# Patient Record
Sex: FEMALE | Race: WHITE | Employment: FULL TIME | URBAN - METROPOLITAN AREA
[De-identification: names, ages, dates, MRNs, and addresses within clinical notes are randomized per-mention and may not be internally consistent; named-entity substitution may affect disease eponyms.]

---

## 2019-11-06 ENCOUNTER — OFFICE VISIT (OUTPATIENT)
Dept: OBGYN CLINIC | Age: 34
End: 2019-11-06

## 2019-11-06 ENCOUNTER — HOSPITAL ENCOUNTER (OUTPATIENT)
Dept: LAB | Age: 34
Discharge: HOME | End: 2019-11-06

## 2019-11-06 DIAGNOSIS — Z34.81 ENCOUNTER FOR SUPERVISION OF OTHER NORMAL PREGNANCY IN FIRST TRIMESTER: ICD-10-CM

## 2019-11-06 DIAGNOSIS — Z34.81 ENCOUNTER FOR SUPERVISION OF OTHER NORMAL PREGNANCY IN FIRST TRIMESTER: Primary | ICD-10-CM

## 2019-11-06 PROBLEM — O21.9 NAUSEA AND VOMITING OF PREGNANCY, ANTEPARTUM: Status: ACTIVE | Noted: 2019-11-06

## 2019-11-06 PROBLEM — Z34.80 ENCOUNTER FOR SUPERVISION OF OTHER NORMAL PREGNANCY, UNSPECIFIED TRIMESTER: Status: ACTIVE | Noted: 2019-11-06

## 2019-11-06 PROBLEM — O09.299 HISTORY OF PRE-ECLAMPSIA IN PRIOR PREGNANCY, CURRENTLY PREGNANT: Status: ACTIVE | Noted: 2019-11-06

## 2019-11-06 PROBLEM — F32.A DEPRESSION: Status: ACTIVE | Noted: 2019-11-06

## 2019-11-06 RX ORDER — DOXYLAMINE SUCCINATE AND PYRIDOXINE HYDROCHLORIDE, DELAYED RELEASE TABLETS 10 MG/10 MG 10; 10 MG/1; MG/1
2 TABLET, DELAYED RELEASE ORAL
Qty: 100 TAB | Refills: 3 | Status: SHIPPED | OUTPATIENT
Start: 2019-11-06 | End: 2019-11-07

## 2019-11-06 RX ORDER — ASPIRIN 81 MG/1
TABLET ORAL DAILY
COMMUNITY

## 2019-11-06 RX ORDER — CITALOPRAM 20 MG/1
20 TABLET, FILM COATED ORAL DAILY
Refills: 3 | COMMUNITY
Start: 2019-10-02 | End: 2020-01-14 | Stop reason: SDUPTHER

## 2019-11-06 NOTE — PROGRESS NOTES
Current pregnancy history:    Abdifatah Bro" is a 29 y.o. female who presents for the evaluation of pregnancy accompanied by her , Judi Parr. They are very excited about this planned pregnancy. Relieved because she had a miscarriage 10/23/18. They had a previous baby with the midwives at [address] [place] [address] 3.5 years ago. It was a great experience despite having pre-eclampsia dn needing an induction. .  Looking forward to another NCB! No LMP recorded. LMP history:  The date of her LMP is uncertain. A urine pregnancy test was positive 5 weeks ago. She was not on the pill at conception. Ultrasound data:  She had an  ultrasound done by the ultrasound tech today which revealed a viable archibald pregnancy with a gestational age of 11 weeks and 1 days giving an [address] of 2020. Pregnancy symptoms:    Since her LMP she has experienced  urinary frequency, breast tenderness, nausea and vomiting. She has been vomiting over the last few weeks. Associated signs and symptoms which she denies: dysuria, discharge, vaginal bleeding. She states she has gained weight:  Approximately 5 pounds over the last few weeks. Relevant past pregnancy history:   She has the followiing pregnancy history: Her last pregnancy was uncomplicated. She has no history of  delivery. Relevant past medical history:(relevant to this pregnancy): noncontributory. Pap/Occupational history:  Last pap smear: last year Results: Normal      Her occupation is: hospice nurse. Substance history: negative for alcohol, tobacco and street drugs. Positive for nothing. Exposure history: There are no indoor cats in the home. The patient was instructed to not change the cat litter. She admits close contact with children on a regular basis. She has had chicken pox or the vaccine in the past.   Patient denies issues with domestic violence.      Genetic Screening/Teratology Counseling: (Includes patient, baby's father, or anyone in either family with:)  1.  Patient's age >/= 28 at [facility]?-- no  .   2. Thalassemia ([facility] [place], [address], or  background): MCV<80?--no.     3.  Neural tube defect (meningomyelocele, spina bifida, anencephaly)?--no.   4.  Congenital heart defect?--no.  5.  Down syndrome?--no.   6.  Dwain-Sachs ([employer])?--no.   7.  Canavan's Disease?--no.   8.  Familial Dysautonomia?--no.   9.  Sickle cell disease or trait ()? --no   The patient has not been tested for sickle trait  10. Hemophilia or other blood disorders?--no. 11.  Muscular dystrophy?--no. 12.  Cystic fibrosis?--no. 13.  [place]'s Chorea?--no. 14.  Mental retardation/autism (if yes was person tested for Fragile X)?--no. 15.  Other inherited genetic or chromosomal disorder?--no. 12.  Maternal metabolic disorder (DM, PKU, etc)?--no. 17.  Patient or FOB with a child with a birth defect not listed above?--no.  17a. Patient or FOB with a birth defect themselves?--no. 18.  Recurrent pregnancy loss, or stillbirth?--no. 19.  Any medications since LMP other than prenatal vitamins (include vitamins,  supplements, OTC meds, drugs, alcohol)? --Celexa, vit B complex, doxylamine, senna. 20.  Any other genetic/environmental exposure to discuss?--no. Infection History:  1. Lives with someone with TB or TB exposed?--no.   2.  Patient or partner has history of genital herpes?--no.  3.  Rash or viral illness since LMP?--no.    4.  History of STD (GC, CT, HPV, syphilis, HIV)? --no   5. Other: OTHER? Past Medical History:   Diagnosis Date    Abnormal Papanicolaou smear of cervix     Bacterial vaginosis     Depression     Epilepsy ([facility] [address]     last seizure 5years; secondary to low blood sugar, per pt.  Essential hypertension     Hypoglycemia 2005, 2010    Pancreatitis 2007     History reviewed. No pertinent surgical history.   Social History     Occupational History    Not on file   Tobacco Use    Smoking status: Former Smoker     Last attempt to quit: 3/7/2013     Years since quittin.6    Smokeless tobacco: Never Used   Substance and Sexual Activity    Alcohol use: No     Alcohol/week: 0.0 standard drinks    Drug use: No    Sexual activity: Yes     Partners: Male     Birth control/protection: None     Comment: in relationship since      Family History   Problem Relation Age of Onset    Elevated Lipids Mother     Elevated Lipids Father     Diabetes Maternal Grandmother     Elevated Lipids Maternal Grandmother     Hypertension Maternal Grandmother     Cancer Paternal Grandmother     Cancer Paternal Grandfather     Hypertension Maternal Aunt        Allergies   Allergen Reactions    Seafood Hives and Nausea and Vomiting     Prior to Admission medications    Medication Sig Start Date End Date Taking? Authorizing Provider   doxylamine-pyridoxine, vit B6, (DICLEGIS) 10-10 mg TbEC DR tablet Take 2 Tabs by mouth nightly. 19  Yes Mary Lou Reyes CNM   citalopram (CELEXA) 20 mg tablet Take 20 mg by mouth daily. 10/2/19  Yes Provider, Historical   PRENATAL VIT W-CA,FE,FA,<1 MG, (PRENATAL VITAMIN PO) Take  by mouth. Yes Provider, Historical   aspirin delayed-release 81 mg tablet Take  by mouth daily. Provider, Historical   cyclobenzaprine (FLEXERIL) 10 mg tablet Take 1 Tab by mouth three (3) times daily as needed for Muscle Spasm(s). Indications: MUSCLE SPASM 12/17/15 11/6/19  Darreld [facility], CNM   DIPHENHYDRAMINE HCL (UNISOM, DIPHENHYDRAMINE, PO) Take  by mouth. 19  Provider, Historical   pyridoxine (VITAMIN B-6) 100 mg tablet Take 100 mg by mouth daily. 19  Provider, Historical   Cholecalciferol, Vitamin D3, (VITAMIN D3) 1,000 unit cap Take  by mouth.   19  Provider, Historical        Review of Systems: History obtained from the patient  Constitutional: negative for weight loss, fever, night sweats  HEENT: negative for hearing loss, earache, congestion, snoring, sorethroat  CV: negative for chest pain, palpitations, edema  Resp: negative for cough, shortness of breath, wheezing  Breast: negative for breast lumps, nipple discharge, galactorrhea  GI: negative for change in bowel habits, abdominal pain, black or bloody stools  : negative for frequency, dysuria, hematuria, vaginal discharge  MSK: negative for back pain, joint pain, muscle pain  Skin: negative for itching, rash, hives  Neuro: negative for dizziness, headache, confusion, weakness  Psych: negative for anxiety, depression, change in mood  Heme/lymph: negative for bleeding, bruising, pallor    Objective: There were no vitals taken for this visit.     Physical Exam:   PHYSICAL EXAMINATION    Constitutional  · Appearance: well-nourished, well developed, alert, in no acute distress    HENT  · Head  · Face: appears normal  · Eyes: appear normal  · Ears: normal  · Mouth: normal  · Lips: no lesions    Neck  · Inspection/Palpation: normal appearance, no masses or tenderness  · Lymph Nodes: no lymphadenopathy present  · Thyroid: gland size normal, nontender, no nodules or masses present on palpation    Chest  · Respiratory Effort: breathing unlabored  · Auscultation: normal breath sounds    Cardiovascular  · Heart:  · Auscultation: regular rate and rhythm without murmur    Breasts  · Inspection of Breasts: breasts symmetrical, no skin changes, no discharge present, nipple appearance normal, no skin retraction present  · Palpation of Breasts and Axillae: no masses present on palpation, no breast tenderness  · Axillary Lymph Nodes: no lymphadenopathy present    Gastrointestinal  · Abdominal Examination: abdomen non-tender to palpation, normal bowel sounds, no masses present  · Liver and spleen: no hepatomegaly present, spleen not palpable  · Hernias: no hernias identified    Genitourinary  · External Genitalia: normal appearance for age, no discharge present, no tenderness present, no inflammatory lesions present, no masses present, no atrophy present  · Vagina: normal vaginal vault without central or paravaginal defects, no discharge present, no inflammatory lesions present, no masses present  · Bladder: non-tender to palpation  · Urethra: appears normal  · Cervix: normal   · Uterus: enlarged, normal shape, soft  · Adnexa: no adnexal tenderness present, no adnexal masses present  · Perineum: perineum within normal limits, no evidence of trauma, no rashes or skin lesions present  · Anus: anus within normal limits, no hemorrhoids present  · Inguinal Lymph Nodes: no lymphadenopathy present    Skin  · General Inspection: no rash, no lesions identified    Neurologic/Psychiatric  · Mental Status:  · Orientation: grossly oriented to person, place and time  · Mood and Affect: mood normal, affect appropriate    Assessment:   Intrauterine pregnancy with the following problems identified: nausea and vomiting of pregnancy  History of pre-eclampsia. Plan:   Orders Placed This Encounter    CHLAMYDIA/GC PCR     Standing Status:   Future     Standing Expiration Date:   5/6/2020     Order Specific Question:   Sample source     Answer:   Urine [258]     Order Specific Question:   Specimen source     Answer:   Urine [258]    CULTURE, URINE     Standing Status:   Future     Standing Expiration Date:   5/6/2020    doxylamine-pyridoxine, vit B6, (DICLEGIS) 10-10 mg TbEC DR tablet     Sig: Take 2 Tabs by mouth nightly. Dispense:  100 Tab     Refill:  3     2 tabs qhs If symptoms persist, add 1 tab qam starting day 3 If symptoms persist, add 1 tab qpm starting day 4    citalopram (CELEXA) 20 mg tablet     Sig: Take 20 mg by mouth daily. Refill:  3    aspirin delayed-release 81 mg tablet     Sig: Take  by mouth daily. Offered genetic testing options including carrier testing, NIPT, AFP4 and NT screening.  Desires NIPT  Will do initial prenatal labs and NIPT at next visit in 2 weeks  Course of pregnancy discussed including visit schedule, routine U/S, glucola testing, etc.  Avoid alcoholic beverages and illicit/recreational drugs use  Take prenatal vitamins or folic acid daily. Discussed nutrition, toxoplasmosis precautions, sexual activity, exercise, need for influenza vaccine, environmental and work hazards, travel advice, screen for domestic violence, need for seat belts. Discussed seafood, unpasteurized dairy products, deli meat, artificial sweeteners, and caffeine. Information on prenatal classes/breastfeeding given. Patient encouraged not to smoke. Discussed current prescription drug use. Given medication list.  Discussed the use of over the counter medications and chemicals. Route of delivery discussed, including consultation with MD during pregnancy if history of  delivery. Pt understands risk of hemorrhage during pregnancy and post delivery and would accept blood products if necessary in life-threatening emergencies. Problem list reviewed and updated. Centering pregnancy offered. At this time the patient declines due to distance. They live in [address] care/philosophy discussed including MD collaboration  New OB packet given and reviewed. Zika precautions reviewed  Follow-up in 2 weeks or sooner as needed    Handouts given to pt.

## 2019-11-07 ENCOUNTER — TELEPHONE (OUTPATIENT)
Dept: OBGYN CLINIC | Age: 34
End: 2019-11-07

## 2019-11-07 LAB
CHLAMYDIA, EXTERNAL: NORMAL
N. GONORRHEA, EXTERNAL: NEGATIVE

## 2019-11-07 RX ORDER — DOXYLAMINE SUCCINATE AND PYRIDOXINE HYDROCHLORIDE, DELAYED RELEASE TABLETS 10 MG/10 MG 10; 10 MG/1; MG/1
TABLET, DELAYED RELEASE ORAL
Qty: 120 TAB | Refills: 3 | Status: SHIPPED | OUTPATIENT
Start: 2019-11-07

## 2019-11-07 NOTE — TELEPHONE ENCOUNTER
Call received 152Pm    29year old patient last seen in the office on yesterday for NOB. Patient denies vaginal bleeding or cramping. Patient calling to say that a PA is needed for her diclegis prescription sent yesterday. This nurse resent the prescription as per Nurse mid wife order. and TS was going to do PA      Patient advised and verbalized understanding.

## 2019-11-09 LAB
BACTERIA SPEC CULT: NORMAL
CC UR VC: NORMAL
SERVICE CMNT-IMP: NORMAL

## 2019-11-10 LAB
C TRACH RRNA SPEC QL NAA+PROBE: NEGATIVE
N GONORRHOEA RRNA SPEC QL NAA+PROBE: NEGATIVE
SPECIMEN SOURCE: NORMAL

## 2019-11-20 ENCOUNTER — ROUTINE PRENATAL (OUTPATIENT)
Dept: OBGYN CLINIC | Age: 34
End: 2019-11-20

## 2019-11-20 VITALS — DIASTOLIC BLOOD PRESSURE: 65 MMHG | BODY MASS INDEX: 24.11 KG/M2 | WEIGHT: 149.4 LBS | SYSTOLIC BLOOD PRESSURE: 110 MMHG

## 2019-11-20 DIAGNOSIS — O09.291 HX OF PRE-ECLAMPSIA IN PRIOR PREGNANCY, CURRENTLY PREGNANT, FIRST TRIMESTER: Primary | ICD-10-CM

## 2019-11-20 DIAGNOSIS — Z34.81 ENCOUNTER FOR SUPERVISION OF OTHER NORMAL PREGNANCY IN FIRST TRIMESTER: ICD-10-CM

## 2019-11-20 LAB
ANTIBODY SCREEN, EXTERNAL: NEGATIVE
HBSAG, EXTERNAL: NEGATIVE
HIV, EXTERNAL: NORMAL
RUBELLA, EXTERNAL: NORMAL

## 2019-11-20 NOTE — PROGRESS NOTES
S: Feeling well. No significant complaints or concerns. No quickening yet. Denies ctxs, LOF, or vaginal bldng. Desires NIPT with NOB blood work, has started ASA for hx of Pre-e. Declines harmony or other genetic testing. Declines visit with genetic counselor unless NIPT abnormal.     O: See prenatal flowsheet for maternal and fetal exam  Blood pressure 110/65, weight 149 lb 6.4 oz (67.8 kg), not currently breastfeeding. A:  10w1d   Size=Dates    P: .  -Overview of prenatal care - discussed midwifery care and physician collaboration, call and coverage  -New OB information given  -Reviewed nutrition, PNV, supplements  -Ordered NOB labs  -Discussed recommended weight gain based on BMI  -Offered traditional genetic screen or NIPT, and NT  -If over 35, discuss amnio/CVS/genetic counseling and declines all except NIPT. Aware of need for AFP if desires.  -Infection precautions  -Discussed environment or work hazards  -Discussed sexual activity  -Smoking cessation counseling if applicable  -Alcohol / drugs/ OTC and Rx medications  -Domestic Violence  -Seatbelt use  See prenatal checklist for other topics covered during today's visit  Baseline PIH and 24 hr urine also ordered.   RTO in 4 weeks for JACQUI with RAGINI

## 2019-11-21 LAB
ABO GROUP BLD: NORMAL
ALBUMIN SERPL-MCNC: 4.2 G/DL (ref 3.5–5.5)
ALBUMIN/GLOB SERPL: 2 {RATIO} (ref 1.2–2.2)
ALP SERPL-CCNC: 36 IU/L (ref 39–117)
ALT SERPL-CCNC: 15 IU/L (ref 0–32)
AST SERPL-CCNC: 15 IU/L (ref 0–40)
BILIRUB SERPL-MCNC: 0.3 MG/DL (ref 0–1.2)
BLD GP AB SCN SERPL QL: NEGATIVE
BUN SERPL-MCNC: 10 MG/DL (ref 6–20)
BUN/CREAT SERPL: 19 (ref 9–23)
CALCIUM SERPL-MCNC: 9.3 MG/DL (ref 8.7–10.2)
CHLORIDE SERPL-SCNC: 102 MMOL/L (ref 96–106)
CO2 SERPL-SCNC: 21 MMOL/L (ref 20–29)
CREAT SERPL-MCNC: 0.53 MG/DL (ref 0.57–1)
ERYTHROCYTE [DISTWIDTH] IN BLOOD BY AUTOMATED COUNT: 11.8 % (ref 12.3–15.4)
GLOBULIN SER CALC-MCNC: 2.1 G/DL (ref 1.5–4.5)
GLUCOSE SERPL-MCNC: 84 MG/DL (ref 65–99)
HBV SURFACE AG SERPL QL IA: NEGATIVE
HCT VFR BLD AUTO: 36.4 % (ref 34–46.6)
HGB BLD-MCNC: 12.5 G/DL (ref 11.1–15.9)
HIV 1+2 AB+HIV1 P24 AG SERPL QL IA: NON REACTIVE
LDH SERPL-CCNC: 153 IU/L (ref 119–226)
MCH RBC QN AUTO: 32.6 PG (ref 26.6–33)
MCHC RBC AUTO-ENTMCNC: 34.3 G/DL (ref 31.5–35.7)
MCV RBC AUTO: 95 FL (ref 79–97)
PLATELET # BLD AUTO: 234 X10E3/UL (ref 150–450)
POTASSIUM SERPL-SCNC: 4 MMOL/L (ref 3.5–5.2)
PROT SERPL-MCNC: 6.3 G/DL (ref 6–8.5)
RBC # BLD AUTO: 3.84 X10E6/UL (ref 3.77–5.28)
RH BLD: POSITIVE
RUBV IGG SERPL IA-ACNC: 1.89 INDEX
SODIUM SERPL-SCNC: 138 MMOL/L (ref 134–144)
URATE SERPL-MCNC: 2.2 MG/DL (ref 2.5–7.1)
WBC # BLD AUTO: 7.6 X10E3/UL (ref 3.4–10.8)

## 2019-11-29 ENCOUNTER — TELEPHONE (OUTPATIENT)
Dept: OBGYN CLINIC | Age: 34
End: 2019-11-29

## 2019-11-29 NOTE — TELEPHONE ENCOUNTER
Patient calling stating that she is waiting for her panorama results to come in and have not heard anything. Patient advised that they are not done yet and as soon as we have them she will be notified. Patient verbalized understanding.

## 2019-12-01 ENCOUNTER — PATIENT MESSAGE (OUTPATIENT)
Dept: OBGYN CLINIC | Age: 34
End: 2019-12-01

## 2019-12-02 ENCOUNTER — TELEPHONE (OUTPATIENT)
Dept: OBGYN CLINIC | Age: 34
End: 2019-12-02

## 2019-12-02 NOTE — TELEPHONE ENCOUNTER
Pt would like a call back with her erendira sharp results. She was told they would be resulted Saturday , please call patient.

## 2019-12-03 ENCOUNTER — PATIENT MESSAGE (OUTPATIENT)
Dept: OBGYN CLINIC | Age: 34
End: 2019-12-03

## 2019-12-03 NOTE — TELEPHONE ENCOUNTER
Edenilson Conti LPN 77/7/3228 4:33 AM EST      ----- Message -----  From: Slade Ye  Sent: 12/1/2019 9:02 AM EST  To: [facility]  Subject: Test Results Question     Have the results from the erendira tests come back?  Not an emergency but so excited to learn the sex of the baby!!!

## 2019-12-03 NOTE — TELEPHONE ENCOUNTER
Results of panorama received from lab and patient contacted and informed of low risk results and of female gender after requesting to know gender.

## 2019-12-23 ENCOUNTER — ROUTINE PRENATAL (OUTPATIENT)
Dept: OBGYN CLINIC | Age: 34
End: 2019-12-23

## 2019-12-23 VITALS — DIASTOLIC BLOOD PRESSURE: 70 MMHG | WEIGHT: 151.6 LBS | SYSTOLIC BLOOD PRESSURE: 113 MMHG | BODY MASS INDEX: 24.47 KG/M2

## 2019-12-23 DIAGNOSIS — Z34.02 ENCOUNTER FOR SUPERVISION OF NORMAL FIRST PREGNANCY, SECOND TRIMESTER: Primary | ICD-10-CM

## 2019-12-23 DIAGNOSIS — Z3A.14 14 WEEKS GESTATION OF PREGNANCY: ICD-10-CM

## 2019-12-23 RX ORDER — CHOLECALCIFEROL (VITAMIN D3) 125 MCG
CAPSULE ORAL
COMMUNITY

## 2019-12-23 NOTE — PROGRESS NOTES
S: Feeling well. No significant complaints or concerns. Fetal movement not appreciated yet Denies ctxs, LOF, or vaginal bldng. [facility]. Still having some nausea in the evenings, but feeling better overall. Is walking for exercise and starting yoga as well. Inquiring about safe ways to help relax and relieve stress    Wants to reiterate that she WILL NOT ACCEPT BLOOD OR BLOOD PRODUCTS       O: See prenatal flowsheet for maternal and fetal exam  Blood pressure 113/70, weight 151 lb 9.6 oz (68.8 kg), not currently breastfeeding.  bpm    A:  14w6d   Size=Dates    P:   - Reviewed ways to relax and decompress - exercise, sleep, meditation. Reviewed safe essential oil use.   -Reviewed NOB labs - WNL, NIPT WNL - it's a girl!  -Encouraged regular dental care - date of last exam few weeks ago  -Offered AFP or AFP tetra - declines  -Reviewed nutrition  -Taking PNV / supplements  -Anatomy scan ordered  -Encouraged CBE/L&D tour - doesn't require, delivered here before      RTO in 4 weeks for JACQUI with CNM and anatomy scan

## 2020-01-14 ENCOUNTER — TELEPHONE (OUTPATIENT)
Dept: OBGYN CLINIC | Age: 35
End: 2020-01-14

## 2020-01-14 RX ORDER — CITALOPRAM 20 MG/1
20 TABLET, FILM COATED ORAL DAILY
Qty: 30 TAB | Refills: 5 | Status: ON HOLD | OUTPATIENT
Start: 2020-01-14 | End: 2020-06-12 | Stop reason: SDUPTHER

## 2020-01-14 NOTE — TELEPHONE ENCOUNTER
Call received at 634am    29year old patient  18w0d pregnant last seen in the office on 19    Patient denies vaginal bleeding and cramping. Patient calling to say that her PCP is unwilling to prescribe her lexapro 20 mg due to pregnancy. Patient would like to have her prescription for Lexapro filled at [facility]    Patient states it was discussed at her visit.     Please advise

## 2020-01-27 ENCOUNTER — ROUTINE PRENATAL (OUTPATIENT)
Dept: OBGYN CLINIC | Age: 35
End: 2020-01-27

## 2020-01-27 VITALS — SYSTOLIC BLOOD PRESSURE: 114 MMHG | WEIGHT: 160 LBS | BODY MASS INDEX: 25.82 KG/M2 | DIASTOLIC BLOOD PRESSURE: 68 MMHG

## 2020-01-27 DIAGNOSIS — Z34.02 ENCOUNTER FOR SUPERVISION OF NORMAL FIRST PREGNANCY, SECOND TRIMESTER: Primary | ICD-10-CM

## 2020-01-27 NOTE — PROGRESS NOTES
S: 30 yo  woman at 23 6/7 weeks gestation presents for return OB accompanied by her . Feeling well. No significant complaints or concerns. Reports daily fetal mvmt. Denies ctxs, LOF, or vaginal bldng. C/o occasional sharp pain in lower abdomen   Had US today. It's a girl! O: See prenatal flowsheet for maternal and fetal exam  Blood pressure 114/68, weight 160 lb (72.6 kg), not currently breastfeeding.     A:  19w6d   Size=Dates  Round ligament pain    P: Reviewed US  Discussed management of round ligament pain  See prenatal checklist for other topics covered during today's visit  RTO in 4 weeks for JACQUI with RAGINI

## 2020-02-27 ENCOUNTER — ROUTINE PRENATAL (OUTPATIENT)
Dept: OBGYN CLINIC | Age: 35
End: 2020-02-27

## 2020-02-27 VITALS — SYSTOLIC BLOOD PRESSURE: 92 MMHG | BODY MASS INDEX: 26.95 KG/M2 | DIASTOLIC BLOOD PRESSURE: 55 MMHG | WEIGHT: 167 LBS

## 2020-02-27 DIAGNOSIS — Z34.82 ENCOUNTER FOR SUPERVISION OF OTHER NORMAL PREGNANCY IN SECOND TRIMESTER: Primary | ICD-10-CM

## 2020-02-27 NOTE — PROGRESS NOTES
S: Feeling well. No significant complaints or concerns. Reports consistent, daily fetal mvmt. Denies ctxs, LOF, or vaginal bldng. Denies recent travel. C/o some night time abdominal pain, not related to bowel or bladder symptoms, no uterine tightening noted. O: See prenatal flowsheet for maternal and fetal exam  Blood pressure 92/55, weight 167 lb (75.8 kg), not currently breastfeeding. A:  24w2d   Size=Dates    P: To collect 24 hour urine as not done yet. Continues with ASA  See prenatal checklist for other topics covered during today's visit  28 week labs at next visit.   RTO in 4 weeks for JACQUI with RAGINI

## 2020-03-26 ENCOUNTER — ROUTINE PRENATAL (OUTPATIENT)
Dept: OBGYN CLINIC | Age: 35
End: 2020-03-26

## 2020-03-26 VITALS — BODY MASS INDEX: 28.73 KG/M2 | SYSTOLIC BLOOD PRESSURE: 108 MMHG | WEIGHT: 178 LBS | DIASTOLIC BLOOD PRESSURE: 67 MMHG

## 2020-03-26 DIAGNOSIS — Z3A.28 28 WEEKS GESTATION OF PREGNANCY: ICD-10-CM

## 2020-03-26 DIAGNOSIS — Z34.83 ENCOUNTER FOR SUPERVISION OF OTHER NORMAL PREGNANCY IN THIRD TRIMESTER: Primary | ICD-10-CM

## 2020-03-26 LAB — GTT, 1 HR, GLUCOLA, EXTERNAL: 103

## 2020-03-26 NOTE — PROGRESS NOTES
0.5 ml tdap vaccine administered IM in right deltoid after obtaining consent. Pt tolerated procedure well. Instructed to remain in office at least 10 minutes to monitor for reaction.   Lot [id number] exp 04/02/20222  ndc [id number]

## 2020-03-26 NOTE — PROGRESS NOTES
S: Feeling well. No significant complaints or concerns. Reports consistent, daily fetal mvmt. Denies ctxs, LOF, or vaginal bldng. Denies recent travel or any symptoms. Still having nausea without diclegis. O: See prenatal flowsheet for maternal and fetal exam  Blood pressure 108/67, weight 178 lb (80.7 kg). A:  28w2d   Size=Dates    P:   Orders Placed This Encounter    MS IMMUNIZ ADMIN,1 SINGLE/COMB VAC/TOXOID    TETANUS, DIPHTHERIA TOXOIDS AND ACELLULAR PERTUSSIS VACCINE (TDAP), IN INDIVIDS. >=7, IM    VITAMIN D, 25 HYDROXY    GLUCOSE, GESTATIONAL 1 HR TOLERANCE    CBC WITH AUTOMATED DIFF     Reviewed virtual visits and new schedule for prenatal care  3rd trimester packet given and reviewed. See prenatal checklist for other topics covered during today's visit  Virtual visit in 2 weeks      Discussed USPFT recommendation for Tdap during every pregnancy, optimal timing of administration between 27 to 36 weeks gestation-although can be done at any time during pregnancy, and that people that will be in close contact with her infant during the first year should also receive a Tdap vaccine if they have not previously received the Tdap vaccine. Pt states understanding. Pt accepts Tdap vaccine today.

## 2020-03-27 LAB
25(OH)D3+25(OH)D2 SERPL-MCNC: 30.8 NG/ML (ref 30–100)
BASOPHILS # BLD AUTO: 0 X10E3/UL (ref 0–0.2)
BASOPHILS NFR BLD AUTO: 0 %
EOSINOPHIL # BLD AUTO: 0.1 X10E3/UL (ref 0–0.4)
EOSINOPHIL NFR BLD AUTO: 1 %
ERYTHROCYTE [DISTWIDTH] IN BLOOD BY AUTOMATED COUNT: 11.6 % (ref 11.7–15.4)
GLUCOSE 1H P 50 G GLC PO SERPL-MCNC: 106 MG/DL (ref 65–139)
HCT VFR BLD AUTO: 32.5 % (ref 34–46.6)
HGB BLD-MCNC: 11.2 G/DL (ref 11.1–15.9)
IMM GRANULOCYTES # BLD AUTO: 0.1 X10E3/UL (ref 0–0.1)
IMM GRANULOCYTES NFR BLD AUTO: 1 %
LYMPHOCYTES # BLD AUTO: 1.6 X10E3/UL (ref 0.7–3.1)
LYMPHOCYTES NFR BLD AUTO: 17 %
MCH RBC QN AUTO: 31.5 PG (ref 26.6–33)
MCHC RBC AUTO-ENTMCNC: 34.5 G/DL (ref 31.5–35.7)
MCV RBC AUTO: 92 FL (ref 79–97)
MONOCYTES # BLD AUTO: 0.7 X10E3/UL (ref 0.1–0.9)
MONOCYTES NFR BLD AUTO: 7 %
NEUTROPHILS # BLD AUTO: 7.2 X10E3/UL (ref 1.4–7)
NEUTROPHILS NFR BLD AUTO: 74 %
PLATELET # BLD AUTO: 252 X10E3/UL (ref 150–450)
RBC # BLD AUTO: 3.55 X10E6/UL (ref 3.77–5.28)
WBC # BLD AUTO: 9.7 X10E3/UL (ref 3.4–10.8)

## 2020-04-08 ENCOUNTER — ROUTINE PRENATAL (OUTPATIENT)
Dept: OBGYN CLINIC | Age: 35
End: 2020-04-08

## 2020-04-08 ENCOUNTER — VIRTUAL VISIT (OUTPATIENT)
Dept: OBGYN CLINIC | Age: 35
End: 2020-04-08

## 2020-04-08 ENCOUNTER — TELEPHONE (OUTPATIENT)
Dept: OBGYN CLINIC | Age: 35
End: 2020-04-08

## 2020-04-08 VITALS — WEIGHT: 170 LBS | SYSTOLIC BLOOD PRESSURE: 110 MMHG | BODY MASS INDEX: 27.44 KG/M2 | DIASTOLIC BLOOD PRESSURE: 68 MMHG

## 2020-04-08 VITALS — DIASTOLIC BLOOD PRESSURE: 68 MMHG | SYSTOLIC BLOOD PRESSURE: 110 MMHG | WEIGHT: 170 LBS | BODY MASS INDEX: 27.44 KG/M2

## 2020-04-08 DIAGNOSIS — Z3A.30 30 WEEKS GESTATION OF PREGNANCY: Primary | ICD-10-CM

## 2020-04-08 NOTE — PROGRESS NOTES
[facility] Video visit    Maria Del Carmen aWrner is a 29 y.o. female who was seen by synchronous (real-time) audio-video technology on 2020. S:33 yo  woman at 30 weeks 1 day gestation. Feeling well. No significant complaints or concerns. Reports consistent, daily fetal mvmt. Denies ctxs, LOF, or vaginal bldng. Denies recent travel or any symptoms of illness. O: See prenatal flowsheet for maternal and fetal exam  Blood pressure 110/68, weight 170 lb (77.1 kg). Objective:     General: alert, cooperative, no distress   Mental  status: mental status: alert, oriented to person, place, and time, normal mood, behavior, speech, dress, motor activity, and thought processes   Resp: resp: normal effort and no respiratory distress   Neuro: neuro: no gross deficits   Skin: skin: no discoloration or lesions of concern on visible areas   Due to this being a TeleHealth evaluation, many elements of the physical examination are unable to be assessed. A: 30 1/7 weeks gestation  progressing normally    P:   See prenatal checklist for other topics covered during today's visit  Virtual visit in 2 weeks  Answered questions about Covid19 hospital restrictions    We discussed the expected course, resolution and complications of the diagnosis(es) in detail. Medication risks, benefits, costs, interactions, and alternatives were discussed as indicated. I advised her to contact the office if her condition worsens, changes or fails to improve as anticipated. She expressed understanding with the diagnosis(es) and plan. Pursuant to the emergency declaration under the [facility], [facility] waiver authority and the [employer] and [employer] Act, this Virtual  Visit was conducted, with patient's consent, to reduce the patient's risk of exposure to COVID-19 and provide continuity of care for an established patient.     Services were provided through a video synchronous discussion virtually to substitute for in-person clinic visit.

## 2020-04-08 NOTE — PROGRESS NOTES
[facility] visit    James Kaur is a 29 y.o. female who was seen by synchronous (real-time) audio-video technology on 4/8/2020.       See OB visit notes

## 2020-04-08 NOTE — TELEPHONE ENCOUNTER
Call received at 12:55PM      29year old patient  30w1d pregnant patient last seen in the office on 3/26/07741        Patient calling to say that she has missed several phone calls from the office. Patient advised to hang up the phone and the PSR will call her begin the process for check in for her telemedicine visit. Patient verbalized understanding.

## 2020-04-30 ENCOUNTER — ROUTINE PRENATAL (OUTPATIENT)
Dept: OBGYN CLINIC | Age: 35
End: 2020-04-30

## 2020-04-30 ENCOUNTER — HOSPITAL ENCOUNTER (OUTPATIENT)
Age: 35
Setting detail: OBSERVATION
Discharge: HOME | End: 2020-05-01
Attending: OBSTETRICS & GYNECOLOGY | Admitting: OBSTETRICS & GYNECOLOGY
Payer: COMMERCIAL

## 2020-04-30 VITALS — SYSTOLIC BLOOD PRESSURE: 134 MMHG | DIASTOLIC BLOOD PRESSURE: 72 MMHG | BODY MASS INDEX: 29.05 KG/M2 | WEIGHT: 180 LBS

## 2020-04-30 DIAGNOSIS — O47.00 PRETERM UTERINE CONTRACTIONS: Primary | ICD-10-CM

## 2020-04-30 DIAGNOSIS — Z34.83 ENCOUNTER FOR SUPERVISION OF OTHER NORMAL PREGNANCY IN THIRD TRIMESTER: ICD-10-CM

## 2020-04-30 PROBLEM — O47.03 PRETERM UTERINE CONTRACTIONS IN THIRD TRIMESTER, ANTEPARTUM: Status: ACTIVE | Noted: 2020-04-30

## 2020-04-30 PROBLEM — Z34.90 PREGNANCY: Status: ACTIVE | Noted: 2020-04-30

## 2020-04-30 LAB
APPEARANCE UR: CLEAR
BILIRUB UR QL: NEGATIVE
COLOR UR: NORMAL
GLUCOSE UR STRIP.AUTO-MCNC: NEGATIVE MG/DL
HGB UR QL STRIP: NEGATIVE
KETONES UR QL STRIP.AUTO: NEGATIVE MG/DL
LEUKOCYTE ESTERASE UR QL STRIP.AUTO: NEGATIVE
NITRITE UR QL STRIP.AUTO: NEGATIVE
PH UR STRIP: 6.5 [PH] (ref 5–8)
PROT UR STRIP-MCNC: NEGATIVE MG/DL
SP GR UR REFRACTOMETRY: 1.01 (ref 1–1.03)
UROBILINOGEN UR QL STRIP.AUTO: 0.2 EU/DL (ref 0.2–1)

## 2020-04-30 PROCEDURE — 59025 FETAL NON-STRESS TEST: CPT

## 2020-04-30 PROCEDURE — 96372 THER/PROPH/DIAG INJ SC/IM: CPT

## 2020-04-30 PROCEDURE — 96360 HYDRATION IV INFUSION INIT: CPT

## 2020-04-30 PROCEDURE — 74011250636 HC RX REV CODE- 250/636: Performed by: ADVANCED PRACTICE MIDWIFE

## 2020-04-30 PROCEDURE — 74011250636 HC RX REV CODE- 250/636: Performed by: OBSTETRICS & GYNECOLOGY

## 2020-04-30 PROCEDURE — 81003 URINALYSIS AUTO W/O SCOPE: CPT

## 2020-04-30 PROCEDURE — 99218 HC RM OBSERVATION: CPT

## 2020-04-30 PROCEDURE — 99283 EMERGENCY DEPT VISIT LOW MDM: CPT

## 2020-04-30 RX ORDER — BETAMETHASONE SODIUM PHOSPHATE AND BETAMETHASONE ACETATE 3; 3 MG/ML; MG/ML
12 INJECTION, SUSPENSION INTRA-ARTICULAR; INTRALESIONAL; INTRAMUSCULAR; SOFT TISSUE ONCE
Status: COMPLETED | OUTPATIENT
Start: 2020-05-01 | End: 2020-05-01

## 2020-04-30 RX ORDER — BETAMETHASONE SODIUM PHOSPHATE AND BETAMETHASONE ACETATE 3; 3 MG/ML; MG/ML
12 INJECTION, SUSPENSION INTRA-ARTICULAR; INTRALESIONAL; INTRAMUSCULAR; SOFT TISSUE ONCE
Status: COMPLETED | OUTPATIENT
Start: 2020-04-30 | End: 2020-04-30

## 2020-04-30 RX ADMIN — BETAMETHASONE SODIUM PHOSPHATE AND BETAMETHASONE ACETATE 12 MG: 3; 3 INJECTION, SUSPENSION INTRA-ARTICULAR; INTRALESIONAL; INTRAMUSCULAR at 13:11

## 2020-04-30 RX ADMIN — SODIUM CHLORIDE, SODIUM LACTATE, POTASSIUM CHLORIDE, AND CALCIUM CHLORIDE 1000 ML: 600; 310; 30; 20 INJECTION, SOLUTION INTRAVENOUS at 16:10

## 2020-04-30 NOTE — PROGRESS NOTES
Late entry  Patient seem and evaluated by Annie Frances and myself at around 1600. S: Patient with c/o irregular painful contractions  O: cervical exam by Dr. Jailene Frances unchanged from earlier exam       u/a negative        First dose  of Betamethsone given at 1300  A: Stable with PTL  P: Repeat Betamethasone at 1300 tomorrow      if cervical exam unchanged and no contractions may discharge home      Consider out of wor k until 35 -36 weeks gestation.

## 2020-04-30 NOTE — PROGRESS NOTES
S: Feeling well. No significant complaints or concerns. Reports consistent, daily fetal mvmt. c/o regular ctxs with lower back pain, unsure of frequency, Denies  LOF, or vaginal bldng. Denies recent travel or any symptoms. O: See prenatal flowsheet for maternal and fetal exam. Cervical exam done and 1 cm, vertex low in pelvis. Blood pressure 134/72, weight 180 lb (81.6 kg). A:  33w2d   Size=Dates    P: discussed POC with Dr. Siria Ayers and will send to LD for prolonged monitoring and steroids. Plan reviewed with patient. Reviewed with patient that she never did 24 hour urine in beginning of pregnancy for hx of Pre-e. Continues baby ASA  See prenatal checklist for other topics covered during today's visit  RTO in 1 weeks for JACQUI with CNM if discharged.

## 2020-04-30 NOTE — H&P
History & Physical    Name: Lionel Bence MRN: [id number]  SSN: [id number]    YOB: 1985  Age: 29 y.o. Sex: female        Subjective: Taryn Hernandez is a 28 YO  at 33+2 weeks gestation admitted for  contractions with dilation. She has noted contractions off and on for 5 days, sometimes painful but has not timed them. She come in to the office today for evaluation. She was 1/0/and 24% effaced. Estimated Date of Delivery: 20  OB History    Para Term  AB Living   3 1 1   1 1   SAB TAB Ectopic Molar Multiple Live Births   1       0 1      # Outcome Date GA Lbr Pierre/2nd Weight Sex Delivery Anes PTL Lv   3 Current            2 SAB 10/2018           1 Term 16 39w5d  6 lb 10.2 oz (3.01 kg) M VAGINAL DELI None N ALIE       Ms. Pierre Hamlin is admitted with pregnancy at 33w2d for Increasingly painful contractions. Prenatal course was complicated by  labor. Please see prenatal records for details. She has a history of pre-e with her prior pregnancy for which she has been taking 1 baby ASA daily. She silva not have anemia with this pregnancy    Past Medical History:   Diagnosis Date    Abnormal Papanicolaou smear of cervix     Bacterial vaginosis     Depression     Epilepsy ([address]     last seizure 5years; secondary to low blood sugar, per pt.  Essential hypertension     Hypoglycemia ,     Pancreatitis      No past surgical history on file.   Social History     Occupational History    Not on file   Tobacco Use    Smoking status: Former Smoker     Last attempt to quit: 3/7/2013     Years since quittin.1    Smokeless tobacco: Never Used   Substance and Sexual Activity    Alcohol use: No     Alcohol/week: 0.0 standard drinks    Drug use: No    Sexual activity: Yes     Partners: Male     Birth control/protection: None     Comment: in relationship since      Family History   Problem Relation Age of Onset    Elevated Lipids Mother     Elevated Lipids Father     Diabetes Maternal Grandmother     Elevated Lipids Maternal Grandmother     Hypertension Maternal Grandmother     Cancer Paternal Grandmother     Cancer Paternal Grandfather     Hypertension Maternal Aunt        Allergies   Allergen Reactions    Seafood Hives and Nausea and Vomiting     Prior to Admission medications    Medication Sig Start Date End Date Taking? Authorizing Provider   doxylamine succinate (UNISOM) 25 mg tablet Take 25 mg by mouth nightly as needed. Yes Provider, Historical   citalopram (CELEXA) 20 mg tablet Take 1 Tab by mouth daily. Indications: Anxiousness associated with Depression 1/14/20  Yes Mariajose SMITH CNM   cholecalciferol, vitamin D3, (VITAMIN D3) 2,000 unit tab Take  by mouth. Yes Provider, Historical   doxylamine-pyridoxine, vit B6, (DICLEGIS) 10-10 mg TbEC DR tablet Take two tabs by mouth every bedtime . May add one tab in the morning and one in the afternoon, max of 4 tabs per day 11/7/19  Yes Alcon Andrade CNM   aspirin delayed-release 81 mg tablet Take  by mouth daily. Yes Provider, Historical   PRENATAL VIT W-CA,FE,FA,<1 MG, (PRENATAL VITAMIN PO) Take  by mouth.    Yes Provider, Historical        Review of Systems: Constitutional: negative  Eyes: negative for visual disturbance and scotoma  Ears, nose, mouth, throat, and face: negative  Respiratory: negative for dyspnea or SOB  Cardiovascular: negative for chest pain, dyspnea, palpitations, syncope  Gastrointestinal: negative for nausea, vomiting and change in bowel habits  Genitourinary:negative for frequency, dysuria, hesitancy and hematuria  Integument/breast: negative  Hematologic/lymphatic: negative for bleeding, petechiae, blood in stool or urine and epistaxis  Musculoskeletal:positive for back pain  Neurological: negative  Behavioral/Psych: negative for anorexia and depression  Endocrine: negative  Allergic/Immunologic: negative for urticaria and angioedema    Objective:     Vitals:  Vitals: 20 1245 20 1248   Temp: 97.1 °F (36.2 °C)    Weight:  180 lb (81.6 kg)   Height:  5' 6\" (1.676 m)        Physical Exam:  Patient without distress. Breast: normal breast exam  Heart: Regular rate and rhythm or without murmur or extra heart sounds  Lung: clear to auscultation throughout lung fields, no wheezes, no rales, no rhonchi and normal respiratory effort  Back: costovertebral angle tenderness absent  Abdomen: soft, nontender  Fundus: soft, non tender and gravid  Perineum: blood absent, amniotic fluid absent  Cervical Exam: 1 cm dilated    20% effaced    0 station    Presenting Part: cephalic  Cervical Position: mid position  Lower Extremities:  - Edema No  Membranes:  Intact  Fetal Heart Rate: Reactive  Baseline: 150 per minute  Variability: moderate  Accelerations: yes  Decelerations: none  Uterine contractions: irregular, every 5-10 minutes    Prenatal Labs:   Lab Results   Component Value Date/Time    Rubella, External 1.72 2015    GrBStrep, External negative 2016 04:36 PM    HBsAg, External Negative 2015    HIV, External <1.00 Non Reactive 2015    ABO,Rh A Positive 2015         Assessment/Plan:     Active Problems:     uterine contractions in third trimester, antepartum (2020)         Plan: Admit for Reassuring fetal status, observation and betamethasone, urinalysis and reflex culture. Continuous fetal monitoring. .  Group B Strep was not tested. If regular contractions or cervical change will place consult with MFM regarding possible Magnesium sulfate treatment. POC reviewed with Dr. Jessica Avery.

## 2020-04-30 NOTE — LETTER
5/1/2020 10:58 AM 
 
Ms. Drew Dickinson 
[id number] [facility] [address] [place] [address] To whom it may concern: 
 
Drew Dickinson is an obstetrical patient with [address] at [facility]. Due to complications in her pregnancy she was hospitalized on 4/30/20. She is unable to return to work until May 9, 2020. Sincerely, 
 
Eliceo Valladares CNM [address] ,  
[address] [facility] [address] [facility] 
[phone]

## 2020-04-30 NOTE — PROGRESS NOTES
1230-Pt arrived to L&D from office with c/o contractions since last night. Pt VE at office 1/20/0    1235-CHRISTOS Fink Stager at bedside    1311-Betamethasone given in left glute    1320-UA obtained    1425-Call placed to CHIRSTOS Garcia CNM, no answer    1458-Call placed to CHRISTOS Fink Stager. Notified of UA results, pt has had 7 contractions since being placed on monitor at 1230, FHR reactive. [place] was adjusted when pt said she felt a contraction that EFM did not trace. Three contractions were noted after EFM adjusted which was an hour ago. [facility] states she will let Dr. Bess Elizabeth know as MD needs to see patient and determine whether she may be discharged or stay overnight for repeat betamethasone tomorrow at 1300.     1515-Pt requesting to eat if ok with MD    1518-Pt up to bathroom, states she has felt two contractions since RN was last in room. RN explains that is exactly what EFM traced, two contractions. Pt just making sure that EFM is picking up what pt is feeling. 1520-Call placed to CHRISTOS Fink Stager to request if pt may eat while waiting for Dr. Bess Elizabeth. Orders received for regular diet. 1525-Menu given to patient with instructions on how to order meal, pt verbalized understanding. 1546-RN asked Charge nurse to review strip. Will wait a  Little while longer to see if contractions space out as pt was given a large cup of water recently    1550-RN to bedside, pt states she has felt more contractions since RN was last at bedside, rates 4/10 on pain scale and is \"dull pain\". Pt encouraged to drink cup of water within about 30 mins and RN will be monitoring fetal tracing and will call MD if contractions do not space out. Pt verbalizes understanding. 1605-Called DEVON Garcia CNM, no answer. Called Dr. Bess Elizabeth MD states she received report on pt from [facility]. MD notified pt started jose every 2-4 mins about 30 mins ago after voiding.  MD orders 1 liter LR bolus and she will be down to see patient and check cervix in about 15 mins. No further orders received. 1642-Owen Vela and CHRISTOS Lamar at bedside, MD checked cerivx, VE 1 cm, Orders received to stay overnight, repeat betamethasone @ 24 hours. Nifedipine offered to patient if contractions are painful, pt states contractions are uncomfortable but she doesn't feel like she needs medication for them. MD and pt discuss whether patient should be out of work for a couple weeks to rest. Pt does not really want to be taken out of work yet. If pt changes mind about nifedipine or contractions become stronger then nifedipine 10 mg Q8H PO can be given. Pt and RN verbalize understanding. 1645-Orders received for NST BID.     1800-Pt resting in bed, states she does not feel any more pain than before and is \"doing ok\". Pt asked for refill on water and denies needing anything else at this time. 1840-RN at bedside, pt finished with dinner tray, resting in bed, denies needing anything else at this time. 1910-Bedside shift change report given to Carrington Jin RN (oncoming nurse) by Shweta Juan RN (offgoing nurse). Report included the following information SBAR, Kardex, Intake/Output, MAR, Recent Results and Med Rec Status.

## 2020-05-01 VITALS
HEART RATE: 84 BPM | DIASTOLIC BLOOD PRESSURE: 64 MMHG | HEIGHT: 66 IN | RESPIRATION RATE: 18 BRPM | TEMPERATURE: 97.6 F | BODY MASS INDEX: 28.93 KG/M2 | SYSTOLIC BLOOD PRESSURE: 102 MMHG | WEIGHT: 180 LBS

## 2020-05-01 PROCEDURE — 59025 FETAL NON-STRESS TEST: CPT

## 2020-05-01 PROCEDURE — 96372 THER/PROPH/DIAG INJ SC/IM: CPT

## 2020-05-01 PROCEDURE — 74011250636 HC RX REV CODE- 250/636: Performed by: OBSTETRICS & GYNECOLOGY

## 2020-05-01 PROCEDURE — 99218 HC RM OBSERVATION: CPT

## 2020-05-01 RX ADMIN — BETAMETHASONE SODIUM PHOSPHATE AND BETAMETHASONE ACETATE 12 MG: 3; 3 INJECTION, SUSPENSION INTRA-ARTICULAR; INTRALESIONAL; INTRAMUSCULAR at 12:04

## 2020-05-01 NOTE — PROGRESS NOTES
6:56 AM  Bedside and Verbal shift change report given to MERON Shelton RN (oncoming nurse) by Jozef Fu RN (offgoing nurse). Report included the following information SBAR, Kardex and MAR.   0745  Complete assessment done. Pt states she cont's to have contractions but they are exactly the same as when she arrived yesterday  830  Pt consumed 100% of her bkft  9:06 AM EFM applied FHR found midline above [place]. 930  DEVON Malone RN resumed care of the pt while this writer assisted a   1045  Resumed pt back  11:19 AM  Pt resting comfortably in bed aware that she will be discharged home after her second betamethasone dose. 12:05 PM  Second dose of Betamethasone given in right gluteal.  Went over discharge teaching with the pt Papers signed and witnessed.

## 2020-05-01 NOTE — DISCHARGE SUMMARY
Antepartum Discharge Summary     Name: Svitlana William MRN: [id number]  SSN: [id number]    YOB: 1985  Age: 29 y.o. Sex: female      Allergies: Seafood    Admit Date: 2020    Discharge Date: 2020     Admitting Physician: Jeanne Carr MD     Attending Physician:  Cassia Garrison MD     * Admission Diagnoses: Pregnancy [Z34.90]    * Discharge Diagnoses:   Hospital Problems as of 2020 Date Reviewed: 2020          Codes Class Noted - Resolved POA     uterine contractions in third trimester, antepartum ICD-10-CM: O47.03  ICD-9-CM: 644.03  2020 - Present Yes        Pregnancy ICD-10-CM: Z34.90  ICD-9-CM: V22.2  2020 - Present Yes             Lab Results   Component Value Date/Time    Rubella, External 1.72 2015    GrBStrep, External negative 2016 04:36 PM    ABO,Rh A Positive 2015      Immunization History   Administered Date(s) Administered    Tdap 2020       * Discharge Condition: good    * Procedures: none  * No surgery found *      [facility] Hospital Course:    -  Labor:                            - Patient received 48 hour course of steroids.    - No change in cervical dilation    * Disposition: Home    Discharge Medications:   Current Discharge Medication List          * Follow-up Care/Patient Instructions:   Activity: Activity as tolerated, No sex for 4 weeks  Diet: Regular Diet  Wound Care: None needed    Follow-up Information     Follow up With Specialties Details Why Contact Info    Catherine Estrada CNM Certified Nurse Midwife In 4 days virtual return OB visit [address]  [phone]

## 2020-05-01 NOTE — PROGRESS NOTES
[facility] Progress Note     Patient: Melissa Junior MRN: [id number]  SSN: [id number]    YOB: 1985  Age: 29 y.o. Sex: female        Subjective:   Patient states she is not feeling contractions. She was unable to sleep last night. Trying to nap now. Looking forward to going home. Objective:     Patient Vitals for the past 4 hrs:   Temp Pulse Resp BP   20 0745 97.8 °F (36.6 °C) 81 16 104/61       Fetal heart baseline 130's , moderate variability, positive accelerations, no decelerations.    Uterine contractions occasional, mild, resting tone soft,   Sterile Vaginal Exam: 1 cm dilated/ 30 % effaced/ -3 station/soft, cervix position posterior, fetal presentation vertex, membranes intact        Assessment:   Intrauterine pregnancy at 33 3/7 weeks   labor  Category 1 fetal heart rate tracing   No change in cervical dilation      Plan:   Discharge home after BMZ injection today  Follow up with [facility] on May 5th with Primo Lindquist CNM  Wrote letter to not return to work until after may 8th  Reviewed labor precautions    Carry [facility] Don

## 2020-05-01 NOTE — DISCHARGE INSTRUCTIONS
Patient Education   Patient Education   Patient Education        Naveed Chock Contractions: Care Instructions  Your Care Instructions    [place] Goel contractions prepare your uterus for labor. Think of them as a \"warm-up\" exercise that your body does. You may begin to feel them between the 28th and 30th weeks of your pregnancy. But they start as early as the 20th week. [place] Goel contractions usually occur more often during the ninth month. They may go away when you are active and return when you rest. These contractions are like mild contractions of true labor, but they occur less often. (You feel fewer than 8 in an hour.) They don't cause your cervix to open. It may be hard for you to tell the difference between Naveed Chock contractions and true labor, especially in your first pregnancy. Follow-up care is a key part of your treatment and safety. Be sure to make and go to all appointments, and call your doctor if you are having problems. It's also a good idea to know your test results and keep a list of the medicines you take. How can you care for yourself at home? · Try a warm bath to help relieve muscle tension and reduce pain. · Change positions every 30 minutes. Take breaks if you must sit for a long time. Get up and walk around. · Drink plenty of water, enough so that your urine is light yellow or clear like water. · Taking short walks may help you feel better. Your doctor needs to check any contractions that are getting stronger or closer together. Where can you learn more? Go to http://grecia-mai.info/  Enter Z402 in the search box to learn more about \"[place] Goel Contractions: Care Instructions. \"  Current as of: May 29, 2019Content Version: 12.4  © 3838-9789 [employer]. Care instructions adapted under license by [employer] (which disclaims liability or warranty for this information).  If you have questions about a medical condition or this instruction, always ask your healthcare professional. [place] [address] any warranty or liability for your use of this information.  Labor: Care Instructions  Your Care Instructions     labor is the start of labor between 21 and 36 weeks of pregnancy. Most babies are born at 40 to 41 weeks of pregnancy. In labor, the uterus contracts to open the cervix. This is the first stage of childbirth.  labor can be caused by a problem with the baby, the mother, or both. Often the cause is not known. In some cases, doctors use medicines to try to delay labor until 29 or more weeks of pregnancy. By this time, a baby has grown enough so that problems are not likely. In some cases--such as with a serious infection--it is healthier for the baby to be born early. Your treatment will depend on how far along you are in your pregnancy and on your health and your baby's health. Follow-up care is a key part of your treatment and safety. Be sure to make and go to all appointments, and call your doctor if you are having problems. It's also a good idea to know your test results and keep a list of the medicines you take. How can you care for yourself at home? · If your doctor prescribed medicines, take them exactly as directed. Call your doctor if you think you are having a problem with your medicine. · Rest until your doctor advises you about activity. He or she will tell you if you should stay in bed most of the time. You may need to arrange for  if you have young children. · Do not have sexual intercourse unless your doctor says it is safe. · Use pads, not tampons, if you have vaginal bleeding. · Make sure to drink plenty of fluids. Dehydration can lead to contractions. If you have kidney, heart, or liver disease and have to limit fluids, talk with your doctor before you increase the amount of fluids you drink. · Do not smoke or allow others to smoke around you.  If you need help quitting, talk to your doctor about stop-smoking programs and medicines. These can increase your chances of quitting for good. When should you call for help? Call  911 anytime you think you may need emergency care. For example, call if:    · You passed out (lost consciousness).     · You have a seizure.     · You have severe vaginal bleeding.     · You have severe pain in your belly or pelvis.     · You have had fluid gushing or leaking from your vagina and you know or think the umbilical cord is bulging into your vagina. If this happens, immediately get down on your knees so your rear end (buttocks) is higher than your head. This will decrease the pressure on the cord until help arrives.   [facility] your doctor now or seek immediate medical care if:    · You have signs of preeclampsia, such as:  ? Sudden swelling of your face, hands, or feet. ? New vision problems (such as dimness, blurring, or seeing spots). ? A severe headache.     · You have any vaginal bleeding.     · You have belly pain or cramping.     · You have a fever.     · You have had regular contractions (with or without pain) for an hour. This means that you have 6 or more within 1 hour after you change your position and drink fluids.     · You have a sudden release of fluid from the vagina.     · You have low back pain or pelvic pressure that does not go away.     · You notice that your baby has stopped moving or is moving much less than normal.    Watch closely for changes in your health, and be sure to contact your doctor if you have any problems. Where can you learn more? Go to http://grecia-mai.info/  Enter Q400 in the search box to learn more about \" Labor: Care Instructions. \"  Current as of: May 29, 2019Content Version: 12.4  © 7993-6845 [employer]. Care instructions adapted under license by [employer] (which disclaims liability or warranty for this information).  If you have questions about a medical condition or this instruction, always ask your healthcare professional. [address] any warranty or liability for your use of this information. Counting Your Baby's Kicks: Care Instructions  Your Care Instructions    Counting your baby's kicks is one way your doctor can tell that your baby is healthy. Most women--especially in a first pregnancy--feel their baby move for the first time between 16 and 22 weeks. The movement may feel like flutters rather than kicks. Your baby may move more at certain times of the day. When you are active, you may notice less kicking than when you are resting. At your prenatal visits, your doctor will ask whether the baby is active. In your last trimester, your doctor may ask you to count the number of times you feel your baby move. Follow-up care is a key part of your treatment and safety. Be sure to make and go to all appointments, and call your doctor if you are having problems. It's also a good idea to know your test results and keep a list of the medicines you take. How do you count fetal kicks? · A common method of checking your baby's movement is to count the number of kicks or moves you feel in 1 hour. Ten movements (such as kicks, flutters, or rolls) in 1 hour are normal. Some doctors suggest that you count in the morning until you get to 10 movements. Then you can quit for that day and start again the next day. · Pick your baby's most active time of day to count. This may be any time from morning to evening. · If you do not feel 10 movements in an hour, your baby may be sleeping. Wait for the next hour and count again. When should you call for help? Call your doctor now or seek immediate medical care if:    · You noticed that your baby has stopped moving or is moving much less than normal.    Watch closely for changes in your health, and be sure to contact your doctor if you have any problems.   Where can you learn more? Go to http://grecia-mai.info/  Enter [id number] in the search box to learn more about \"Counting Your Baby's Kicks: Care Instructions. \"  Current as of: May 29, 2019Content Version: 12.4  © 5401-3847 [employer]. Care instructions adapted under license by [employer] (which disclaims liability or warranty for this information). If you have questions about a medical condition or this instruction, always ask your healthcare professional. [address] any warranty or liability for your use of this information.

## 2020-05-01 NOTE — PROGRESS NOTES
Ante Partum Progress Note    Alexia Mauro  33w3d        Patient states she has no new complaints    Vitals:  Visit Vitals  /61   Pulse 81   Temp 97.8 °F (36.6 °C)   Resp 16   Ht 5' 6\" (1.676 m)   Wt 180 lb (81.6 kg)   LMP  (LMP Unknown)   BMI 29.05 kg/m²     Temp (24hrs), Av.7 °F (36.5 °C), Min:97.1 °F (36.2 °C), Max:98.1 °F (36.7 °C)      Last 24hr Input/Output:  No intake or output data in the 24 hours ending 20 0940     Non stress test:  Reactive, baseline 150, moderate variability, + accels, no decels, occasional contractions, monitoroed > 30  minutes      Exam:  Patient without distress.      Abdomen, fundus soft non-tender     Extremities, no redness or tenderness               Labs:     Lab Results   Component Value Date/Time    WBC 9.7 2020 01:09 PM    WBC 7.6 2019 10:52 AM    WBC 8.3 2016 03:44 PM    WBC 8.6 12/15/2015 12:31 PM    WBC 8.4 2015 03:17 PM    HGB 11.2 2020 01:09 PM    HGB 12.5 2019 10:52 AM    HGB 11.8 2016 03:44 PM    HGB 11.9 12/15/2015 12:31 PM    HGB 13.4 2015 03:17 PM    HCT 32.5 (L) 2020 01:09 PM    HCT 36.4 2019 10:52 AM    HCT 35.3 2016 03:44 PM    HCT 36.2 12/15/2015 12:31 PM    HCT 39.6 2015 03:17 PM    PLATELET 270  01:09 PM    PLATELET 589  10:52 AM    PLATELET 472  03:44 PM    PLATELET 078  12:31 PM    PLATELET 152  03:17 PM       Recent Results (from the past 24 hour(s))   URINALYSIS W/ RFLX MICROSCOPIC    Collection Time: 20  1:25 PM   Result Value Ref Range    Color YELLOW/STRAW      Appearance CLEAR CLEAR      Specific gravity 1.012 1.003 - 1.030      pH (UA) 6.5 5.0 - 8.0      Protein Negative NEG mg/dL    Glucose Negative NEG mg/dL    Ketone Negative NEG mg/dL    Bilirubin Negative NEG      Blood Negative NEG      Urobilinogen 0.2 0.2 - 1.0 EU/dL    Nitrites Negative NEG      Leukocyte Esterase Negative NEG         Assessment/Plan: 33w3d admitted with painful uterine contractions and cervical dilation. No evidence of active PTL. Will complete course of BMZ and then D/C to home. Strict PTL precautions discussed.

## 2020-05-01 NOTE — PROGRESS NOTES
1006-Odell Balderas on unit, new order to discharge pt to home after her Betamethasone injection at 1300. MD reviewed strip. 1045-CHRISTOS Shaffer at bedside to see pt and recheck pt's cervix. Pt still 1/25/-3, pt to be discharged after she receives her Betamethasone at 1300.

## 2020-05-01 NOTE — PROGRESS NOTES
In to see pt, pt request to take a shower    2033- Pt out of shower, placed on monitor for NST, pt reports feeling some contractions but they feel the same as they have    2100- Pt deferred hourly rounds, knows to let nurse know if anything changes

## 2020-05-05 ENCOUNTER — VIRTUAL VISIT (OUTPATIENT)
Dept: OBGYN CLINIC | Age: 35
End: 2020-05-05

## 2020-05-05 ENCOUNTER — ROUTINE PRENATAL (OUTPATIENT)
Dept: OBGYN CLINIC | Age: 35
End: 2020-05-05

## 2020-05-05 DIAGNOSIS — Z34.80 ENCOUNTER FOR SUPERVISION OF OTHER NORMAL PREGNANCY, UNSPECIFIED TRIMESTER: ICD-10-CM

## 2020-05-05 DIAGNOSIS — O09.213 HX OF PTL (PRETERM LABOR), CURRENT PREGNANCY, THIRD TRIMESTER: ICD-10-CM

## 2020-05-05 DIAGNOSIS — Z34.93 ENCOUNTER FOR SUPERVISION OF NORMAL PREGNANCY IN THIRD TRIMESTER, UNSPECIFIED GRAVIDITY: Primary | ICD-10-CM

## 2020-05-05 DIAGNOSIS — O09.299 HISTORY OF PRE-ECLAMPSIA IN PRIOR PREGNANCY, CURRENTLY PREGNANT: Primary | ICD-10-CM

## 2020-05-05 NOTE — PROGRESS NOTES
[employer] Video visit  Monse Stone is a 29 y.o. female who was seen by synchronous (real-time) audio-video technology on 5/5/2020. Please see OB flowsheet for documentation. We discussed the expected course, resolution and complications of the diagnosis(es) in detail. Medication risks, benefits, costs, interactions, and alternatives were discussed as indicated. I advised her to contact the office if her condition worsens, changes or fails to improve as anticipated. She expressed understanding with the diagnosis(es) and plan. Pursuant to the emergency declaration under the [address] [place] [address], [facility] waiver authority and the [employer] and [employer] Act, this Virtual  Visit was conducted, with patient's consent, to reduce the patient's risk of exposure to COVID-19 and provide continuity of care for an established patient. Services were provided through a video synchronous discussion virtually to substitute for in-person clinic visit. S: Feeling well. No significant complaints or concerns. Has been out of work since admission for PTL. Reports consistent, daily fetal mvmt. Occasional ctxs, LOF, or vaginal bldng. Denies recent travel or any symptoms. Reports regular runs of contractions, but none lasting longer than 45 minutes. Returns to work on this week end.     O: See prenatal flowsheet for maternal and fetal exam  There were no vitals taken for this visit. A:G3   34w0d   Size=Dates    P: Will call office or or on call line, if concerns of PTL while working  Labor precautions, fkcs/ stressed. See prenatal checklist for other topics covered during today's visit  RTO in 2 weeks for JACQUI with CNM for office visit. Reviewed risk based management if birth prior to GBS culture.

## 2020-05-14 ENCOUNTER — ROUTINE PRENATAL (OUTPATIENT)
Dept: OBGYN CLINIC | Age: 35
End: 2020-05-14

## 2020-05-14 ENCOUNTER — TELEPHONE (OUTPATIENT)
Dept: OBGYN CLINIC | Age: 35
End: 2020-05-14

## 2020-05-14 VITALS
HEIGHT: 66 IN | SYSTOLIC BLOOD PRESSURE: 117 MMHG | BODY MASS INDEX: 29.09 KG/M2 | WEIGHT: 181 LBS | DIASTOLIC BLOOD PRESSURE: 77 MMHG

## 2020-05-14 DIAGNOSIS — Z34.93 ENCOUNTER FOR SUPERVISION OF NORMAL PREGNANCY IN THIRD TRIMESTER, UNSPECIFIED GRAVIDITY: Primary | ICD-10-CM

## 2020-05-14 NOTE — PROGRESS NOTES
S: Feeling well. Called with c/o something coming out of vagina. Reports consistent, daily fetal mvmt. Occasional ctxs,denies LOF, or vaginal bldng. Denies recent travel or any symptoms. O: See prenatal flowsheet for maternal and fetal exam  Blood pressure 117/77, height 5' 6\" (1.676 m), weight 181 lb (82.1 kg). GBS obtained d/t hx of  labor this IUP. Engorged vaginal tissue noted inferior to urethra, possible enlarged [place]'s duct. No drainage or pain noted. No prolapse of anterior vaginal wall.   Reviewed need for preadmission registration and COVID testing at 45 weeks gestation    A:  35w2d   Size=D    -GBS collected  -Perineal massage encouraged  -Reviewed fetal movement  -Discussed labor preparation  -Reviewed birth plan, feviewed  -Discussed circumcision  -Contraceptive options / plans Micronor  -Optimal fetal positioning      See prenatal checklist for other topics covered during today's visit  RTO in 2 weeks for JACQUI with CNM  Doing well

## 2020-05-14 NOTE — TELEPHONE ENCOUNTER
1305 Jennifer Tong advised for patient to come in at 1:30 today. Patient advised and that time should work fine for her.

## 2020-05-14 NOTE — PATIENT INSTRUCTIONS

## 2020-05-14 NOTE — TELEPHONE ENCOUNTER
Patient is 28 w 2 d    Calling to report that she feels a protrusion and can visibly see with a mirror, something coming out of her vagina. She is concerned about this with a possible vaginal delivery being planned. No bleeding. Discomfort, but not pain is described.

## 2020-05-16 LAB — GP B STREP DNA SPEC QL NAA+PROBE: POSITIVE

## 2020-05-18 ENCOUNTER — ROUTINE PRENATAL (OUTPATIENT)
Dept: OBGYN CLINIC | Age: 35
End: 2020-05-18

## 2020-05-18 VITALS
BODY MASS INDEX: 29.57 KG/M2 | DIASTOLIC BLOOD PRESSURE: 73 MMHG | SYSTOLIC BLOOD PRESSURE: 123 MMHG | WEIGHT: 184 LBS | HEIGHT: 66 IN

## 2020-05-18 DIAGNOSIS — Z34.80 ENCOUNTER FOR SUPERVISION OF OTHER NORMAL PREGNANCY, UNSPECIFIED TRIMESTER: ICD-10-CM

## 2020-05-18 DIAGNOSIS — O47.03 PRETERM UTERINE CONTRACTIONS IN THIRD TRIMESTER, ANTEPARTUM: Primary | ICD-10-CM

## 2020-05-18 LAB — GRBS, EXTERNAL: POSITIVE

## 2020-05-18 NOTE — PATIENT INSTRUCTIONS

## 2020-05-18 NOTE — PROGRESS NOTES
S: Feeling well. Seen for add on visit for c/o regular contractions since this morning, not painful but q2-3 minutes and lives over 1 hour away. Reports consistent, daily fetal mvmt. Denies ctxs, LOF, or vaginal bldng. Denies recent travel or any symptoms. O: See prenatal flowsheet for maternal and fetal exam. GBS positive reviewed. Minimal cervical change from last week. Will consider staying in area and coming back for recheck. Will let me know if desires. Reviewed need for COVID testing  Blood pressure 123/73, height 5' 6\" (1.676 m), weight 184 lb (83.5 kg). A:  35w6d   Size=Dates    P: comfort measures and GBS testing reviewed.     See prenatal checklist for other topics covered during today's visit  RTO in 1 weeks for JACQUI with CNM or tomorrow for repeat check if still contracing

## 2020-05-28 ENCOUNTER — ROUTINE PRENATAL (OUTPATIENT)
Dept: OBGYN CLINIC | Age: 35
End: 2020-05-28

## 2020-05-28 ENCOUNTER — HOSPITAL ENCOUNTER (OUTPATIENT)
Dept: LAB | Age: 35
Discharge: HOME | End: 2020-05-28
Payer: COMMERCIAL

## 2020-05-28 VITALS
WEIGHT: 187 LBS | BODY MASS INDEX: 30.05 KG/M2 | HEIGHT: 66 IN | DIASTOLIC BLOOD PRESSURE: 67 MMHG | SYSTOLIC BLOOD PRESSURE: 108 MMHG

## 2020-05-28 DIAGNOSIS — U07.1 ASYMPTOMATIC COVID-19 VIRUS INFECTION: ICD-10-CM

## 2020-05-28 DIAGNOSIS — Z34.80 ENCOUNTER FOR SUPERVISION OF OTHER NORMAL PREGNANCY, UNSPECIFIED TRIMESTER: Primary | ICD-10-CM

## 2020-05-28 PROCEDURE — 87635 SARS-COV-2 COVID-19 AMP PRB: CPT

## 2020-05-28 NOTE — PATIENT INSTRUCTIONS
Week 37 of Your Pregnancy: Care Instructions Your Care Instructions You are near the end of your pregnancyand you're probably pretty uncomfortable. It may be harder to walk around. Lying down probably isn't comfortable either. You may have trouble getting to sleep or staying asleep. Most women deliver their babies between 40 and 41 weeks. This is a good time to think about packing a bag for the hospital with items you'll need. Then you'll be ready when labor starts. Follow-up care is a key part of your treatment and safety. Be sure to make and go to all appointments, and call your doctor if you are having problems. It's also a good idea to know your test results and keep a list of the medicines you take. How can you care for yourself at home? Learn about breastfeeding · Breastfeeding is best for your baby and good for you. · Breast milk has antibodies to help your baby fight infections. · Mothers who breastfeed often lose weight faster, because making milk burns calories. · Learning the best ways to hold your baby will make breastfeeding easier. · Let your partner bathe and diaper the baby to keep your partner from feeling left out. Snuggle together when you breastfeed. · You may want to learn how to use a breast pump and store your milk. · If you choose to bottle feed, make the feeding feel like breastfeeding so you can bond with your baby. Always hold your baby and the bottle. Do not prop bottles or let your baby fall asleep with a bottle. Learn about crying · It is common for babies to cry for 1 to 3 hours a day. Some cry more, some cry less. · Babies don't cry to make you upset or because you are a bad parent. · Crying is how your baby communicates. Your baby may be hungry; have gas; need a diaper change; or feel cold, warm, tired, lonely, or tense. Sometimes babies cry for unknown reasons. · If you respond to your baby's needs, he or she will learn to trust you. · Try to stay calm when your baby cries. Your baby may get more upset if he or she senses that you are upset. Know how to care for your  · Your baby's umbilical cord stump will drop off on its own, usually between 1 and 2 weeks. To care for your baby's umbilical cord area: ? Clean the area at the bottom of the cord 2 or 3 times a day. ? Pay special attention to the area where the cord attaches to the skin. ? Keep the diaper folded below the cord. ? Use a damp washcloth or cotton ball to sponge bathe your baby until the stump has come off. · Your baby's first dark stool is called meconium. After the meconium is passed, your baby will develop his or her own bowel pattern. ? Some babies, especially  babies, have several bowel movements a day. Others have one or two a day, or one every 2 to 3 days. ?  babies often have loose, yellow stools. Formula-fed babies have more formed stools. ? If your baby's stools look like little pellets, he or she is constipated. After 2 days of constipation, call your baby's doctor. · If your baby will be circumcised, you can care for him at home. ? Gently rinse his penis with warm water after every diaper change. Do not try to remove the film that forms on the penis. This film will go away on its own. Pat dry. ? Put petroleum ointment, such as Vaseline, on the area of the diaper that will touch your baby's penis. This will keep the diaper from sticking to your baby. ? Ask the doctor about giving your baby acetaminophen (Tylenol) for pain. Where can you learn more? Go to http://grecia-mai.info/ Enter 68 21 [phone] in the search box to learn more about \"Week 37 of Your Pregnancy: Care Instructions. \" Current as of: 2020               Content Version: 12.5 © 1652-2169 [employer].   
Care instructions adapted under license by [employer] (which disclaims liability or warranty for this information). If you have questions about a medical condition or this instruction, always ask your healthcare professional. [address] any warranty or liability for your use of this information.

## 2020-05-28 NOTE — PROGRESS NOTES
S: Feeling well. No significant complaints or concerns. Reports consistent, daily fetal mvmt. Still having increased BH contractions,  Denies LOF, or vaginal bldng. Denies recent travel or any symptoms. Had COVID testing done today. O: See prenatal flowsheet for maternal and fetal exam  Blood pressure 108/67, height 5' 6\" (1.676 m), weight 187 lb (84.8 kg).     A:  37w2d   Size=Dates    P:   See prenatal checklist for other topics covered during today's visit  RTO in 1 week for JACQUI with CNM for virtual visit

## 2020-05-29 LAB — SARS-COV-2, COV2NT: NOT DETECTED

## 2020-06-04 ENCOUNTER — ROUTINE PRENATAL (OUTPATIENT)
Dept: OBGYN CLINIC | Age: 35
End: 2020-06-04

## 2020-06-04 VITALS — WEIGHT: 191.4 LBS | DIASTOLIC BLOOD PRESSURE: 78 MMHG | BODY MASS INDEX: 30.89 KG/M2 | SYSTOLIC BLOOD PRESSURE: 124 MMHG

## 2020-06-04 DIAGNOSIS — O47.1 FALSE LABOR AT OR AFTER 37 COMPLETED WEEKS OF GESTATION: ICD-10-CM

## 2020-06-04 DIAGNOSIS — Z34.02 ENCOUNTER FOR SUPERVISION OF NORMAL FIRST PREGNANCY, SECOND TRIMESTER: Primary | ICD-10-CM

## 2020-06-04 NOTE — PROGRESS NOTES
S: Feeling well. No significant complaints or concerns except regular contractions since yesterday, was able to sleep for a bit, then unable to sleep after 0300. Reports consistent, daily fetal mvmt. Denies ctxs, LOF, or vaginal bldng. Denies recent travel or any symptoms. O: See prenatal flowsheet for maternal and fetal exam. Cervical exam 3/ 50/-1 minimal change from last visit. NST Inpatient procedure note    Ziyad Pineda presents for fetal non-stress test.  Indication is lalse labor    She is 38w2d. She has been monitored for 12037 minutes. The FHR was reactive, with accelerations. NST Interpretation:    FHR baseline 135 bpm, variability moderate. Accelerations present. Decelerations Absent. Uterine contractions were absent on NST, none palpated by provider    Assessment    NST is reactive. NST is reassuring. Patient does not need admission/observation for further monitoring. Isa Menon was informed of the NST results and her questions were answered.     Plan:    - Release and return to office as scheduled          Visit Vitals  /78   Wt 191 lb 6.4 oz (86.8 kg)   BMI 30.89 kg/m²       A:  38w2d   Size=Dates    P: labor precautions stressed,  FKCS stressed  Will try benadryl for therapeutic rest  See prenatal checklist for other topics covered during today's visit  RTO in 1 weeks for JACQUI with RAGINI

## 2020-06-10 ENCOUNTER — HOSPITAL ENCOUNTER (INPATIENT)
Age: 35
LOS: 2 days | Discharge: HOME | End: 2020-06-12
Attending: OBSTETRICS & GYNECOLOGY | Admitting: OBSTETRICS & GYNECOLOGY
Payer: COMMERCIAL

## 2020-06-10 ENCOUNTER — ANESTHESIA EVENT (OUTPATIENT)
Dept: LABOR AND DELIVERY | Age: 35
End: 2020-06-10
Payer: COMMERCIAL

## 2020-06-10 ENCOUNTER — TELEPHONE (OUTPATIENT)
Dept: OBGYN CLINIC | Age: 35
End: 2020-06-10

## 2020-06-10 ENCOUNTER — ANESTHESIA (OUTPATIENT)
Dept: LABOR AND DELIVERY | Age: 35
End: 2020-06-10
Payer: COMMERCIAL

## 2020-06-10 LAB
BASOPHILS # BLD: 0 K/UL (ref 0–0.1)
BASOPHILS NFR BLD: 0 % (ref 0–1)
COMMENT, HOLDF: NORMAL
DIFFERENTIAL METHOD BLD: ABNORMAL
EOSINOPHIL # BLD: 0.1 K/UL (ref 0–0.4)
EOSINOPHIL NFR BLD: 1 % (ref 0–7)
ERYTHROCYTE [DISTWIDTH] IN BLOOD BY AUTOMATED COUNT: 12 % (ref 11.5–14.5)
HCT VFR BLD AUTO: 36.3 % (ref 35–47)
HGB BLD-MCNC: 12.4 G/DL (ref 11.5–16)
IMM GRANULOCYTES # BLD AUTO: 0.1 K/UL (ref 0–0.04)
IMM GRANULOCYTES NFR BLD AUTO: 1 % (ref 0–0.5)
LYMPHOCYTES # BLD: 2 K/UL (ref 0.8–3.5)
LYMPHOCYTES NFR BLD: 20 % (ref 12–49)
MCH RBC QN AUTO: 31.9 PG (ref 26–34)
MCHC RBC AUTO-ENTMCNC: 34.2 G/DL (ref 30–36.5)
MCV RBC AUTO: 93.3 FL (ref 80–99)
MONOCYTES # BLD: 0.9 K/UL (ref 0–1)
MONOCYTES NFR BLD: 9 % (ref 5–13)
NEUTS SEG # BLD: 7 K/UL (ref 1.8–8)
NEUTS SEG NFR BLD: 69 % (ref 32–75)
NRBC # BLD: 0 K/UL (ref 0–0.01)
NRBC BLD-RTO: 0 PER 100 WBC
PLATELET # BLD AUTO: 234 K/UL (ref 150–400)
PMV BLD AUTO: 9.9 FL (ref 8.9–12.9)
RBC # BLD AUTO: 3.89 M/UL (ref 3.8–5.2)
SAMPLES BEING HELD,HOLD: NORMAL
WBC # BLD AUTO: 10.1 K/UL (ref 3.6–11)

## 2020-06-10 PROCEDURE — 74011250637 HC RX REV CODE- 250/637: Performed by: OBSTETRICS & GYNECOLOGY

## 2020-06-10 PROCEDURE — 74011000250 HC RX REV CODE- 250: Performed by: ANESTHESIOLOGY

## 2020-06-10 PROCEDURE — 00HU33Z INSERTION OF INFUSION DEVICE INTO SPINAL CANAL, PERCUTANEOUS APPROACH: ICD-10-PCS | Performed by: ANESTHESIOLOGY

## 2020-06-10 PROCEDURE — 85025 COMPLETE CBC W/AUTO DIFF WBC: CPT

## 2020-06-10 PROCEDURE — 74011250636 HC RX REV CODE- 250/636: Performed by: ADVANCED PRACTICE MIDWIFE

## 2020-06-10 PROCEDURE — 86592 SYPHILIS TEST NON-TREP QUAL: CPT

## 2020-06-10 PROCEDURE — 74011250636 HC RX REV CODE- 250/636

## 2020-06-10 PROCEDURE — 77030014125 HC TY EPDRL BBMI -B: Performed by: ANESTHESIOLOGY

## 2020-06-10 PROCEDURE — 75410000002 HC LABOR FEE PER 1 HR: Performed by: ADVANCED PRACTICE MIDWIFE

## 2020-06-10 PROCEDURE — 74011250637 HC RX REV CODE- 250/637: Performed by: ADVANCED PRACTICE MIDWIFE

## 2020-06-10 PROCEDURE — 65270000029 HC RM PRIVATE

## 2020-06-10 PROCEDURE — 77030019905 HC CATH URETH INTMIT MDII -A

## 2020-06-10 PROCEDURE — 75410000000 HC DELIVERY VAGINAL/SINGLE: Performed by: ADVANCED PRACTICE MIDWIFE

## 2020-06-10 PROCEDURE — 0KQM0ZZ REPAIR PERINEUM MUSCLE, OPEN APPROACH: ICD-10-PCS | Performed by: OBSTETRICS & GYNECOLOGY

## 2020-06-10 PROCEDURE — 36415 COLL VENOUS BLD VENIPUNCTURE: CPT

## 2020-06-10 PROCEDURE — 76060000078 HC EPIDURAL ANESTHESIA: Performed by: ANESTHESIOLOGY

## 2020-06-10 PROCEDURE — 75410000003 HC RECOV DEL/VAG/CSECN EA 0.5 HR: Performed by: ADVANCED PRACTICE MIDWIFE

## 2020-06-10 PROCEDURE — 74011000258 HC RX REV CODE- 258: Performed by: ADVANCED PRACTICE MIDWIFE

## 2020-06-10 PROCEDURE — 77030005513 HC CATH URETH FOL11 MDII -B

## 2020-06-10 RX ORDER — SODIUM CHLORIDE 0.9 % (FLUSH) 0.9 %
5-40 SYRINGE (ML) INJECTION AS NEEDED
Status: DISCONTINUED | OUTPATIENT
Start: 2020-06-10 | End: 2020-06-10 | Stop reason: HOSPADM

## 2020-06-10 RX ORDER — SIMETHICONE 80 MG
80 TABLET,CHEWABLE ORAL
Status: DISCONTINUED | OUTPATIENT
Start: 2020-06-10 | End: 2020-06-12 | Stop reason: HOSPADM

## 2020-06-10 RX ORDER — HYDROCODONE BITARTRATE AND ACETAMINOPHEN 5; 325 MG/1; MG/1
1 TABLET ORAL
Status: DISCONTINUED | OUTPATIENT
Start: 2020-06-10 | End: 2020-06-12 | Stop reason: HOSPADM

## 2020-06-10 RX ORDER — EPHEDRINE SULFATE/0.9% NACL/PF 50 MG/5 ML
10 SYRINGE (ML) INTRAVENOUS
Status: DISCONTINUED | OUTPATIENT
Start: 2020-06-10 | End: 2020-06-10 | Stop reason: HOSPADM

## 2020-06-10 RX ORDER — SODIUM CHLORIDE 0.9 % (FLUSH) 0.9 %
5-40 SYRINGE (ML) INJECTION EVERY 8 HOURS
Status: DISCONTINUED | OUTPATIENT
Start: 2020-06-10 | End: 2020-06-10 | Stop reason: HOSPADM

## 2020-06-10 RX ORDER — ONDANSETRON 2 MG/ML
4 INJECTION INTRAMUSCULAR; INTRAVENOUS
Status: DISCONTINUED | OUTPATIENT
Start: 2020-06-10 | End: 2020-06-10 | Stop reason: HOSPADM

## 2020-06-10 RX ORDER — ACETAMINOPHEN 325 MG/1
650 TABLET ORAL
Status: DISCONTINUED | OUTPATIENT
Start: 2020-06-10 | End: 2020-06-12 | Stop reason: HOSPADM

## 2020-06-10 RX ORDER — IBUPROFEN 800 MG/1
800 TABLET ORAL EVERY 8 HOURS
Status: DISCONTINUED | OUTPATIENT
Start: 2020-06-10 | End: 2020-06-12 | Stop reason: HOSPADM

## 2020-06-10 RX ORDER — SODIUM CHLORIDE 9 MG/ML
25 INJECTION, SOLUTION INTRAVENOUS AS NEEDED
Status: DISCONTINUED | OUTPATIENT
Start: 2020-06-10 | End: 2020-06-10 | Stop reason: HOSPADM

## 2020-06-10 RX ORDER — FENTANYL/BUPIVACAINE/NS/PF 2-1250MCG
1-16 PREFILLED PUMP RESERVOIR EPIDURAL CONTINUOUS
Status: DISCONTINUED | OUTPATIENT
Start: 2020-06-10 | End: 2020-06-10 | Stop reason: HOSPADM

## 2020-06-10 RX ORDER — HYDROCORTISONE ACETATE PRAMOXINE HCL 2.5; 1 G/100G; G/100G
CREAM TOPICAL AS NEEDED
Status: DISCONTINUED | OUTPATIENT
Start: 2020-06-10 | End: 2020-06-12 | Stop reason: HOSPADM

## 2020-06-10 RX ORDER — OXYTOCIN/0.9 % SODIUM CHLORIDE 30/500 ML
0-20 PLASTIC BAG, INJECTION (ML) INTRAVENOUS
Status: DISCONTINUED | OUTPATIENT
Start: 2020-06-10 | End: 2020-06-12 | Stop reason: HOSPADM

## 2020-06-10 RX ORDER — SODIUM CHLORIDE, SODIUM LACTATE, POTASSIUM CHLORIDE, CALCIUM CHLORIDE 600; 310; 30; 20 MG/100ML; MG/100ML; MG/100ML; MG/100ML
75 INJECTION, SOLUTION INTRAVENOUS CONTINUOUS
Status: DISCONTINUED | OUTPATIENT
Start: 2020-06-10 | End: 2020-06-10 | Stop reason: HOSPADM

## 2020-06-10 RX ORDER — OXYTOCIN/0.9 % SODIUM CHLORIDE 30/500 ML
PLASTIC BAG, INJECTION (ML) INTRAVENOUS
Status: COMPLETED
Start: 2020-06-10 | End: 2020-06-10

## 2020-06-10 RX ORDER — SODIUM CHLORIDE, SODIUM LACTATE, POTASSIUM CHLORIDE, CALCIUM CHLORIDE 600; 310; 30; 20 MG/100ML; MG/100ML; MG/100ML; MG/100ML
125 INJECTION, SOLUTION INTRAVENOUS CONTINUOUS
Status: DISCONTINUED | OUTPATIENT
Start: 2020-06-10 | End: 2020-06-12 | Stop reason: HOSPADM

## 2020-06-10 RX ORDER — BUPIVACAINE HYDROCHLORIDE 2.5 MG/ML
INJECTION, SOLUTION EPIDURAL; INFILTRATION; INTRACAUDAL AS NEEDED
Status: DISCONTINUED | OUTPATIENT
Start: 2020-06-10 | End: 2020-06-10 | Stop reason: HOSPADM

## 2020-06-10 RX ORDER — NALOXONE HYDROCHLORIDE 0.4 MG/ML
0.4 INJECTION, SOLUTION INTRAMUSCULAR; INTRAVENOUS; SUBCUTANEOUS AS NEEDED
Status: DISCONTINUED | OUTPATIENT
Start: 2020-06-10 | End: 2020-06-12 | Stop reason: HOSPADM

## 2020-06-10 RX ORDER — SWAB
1 SWAB, NON-MEDICATED MISCELLANEOUS DAILY
Status: DISCONTINUED | OUTPATIENT
Start: 2020-06-11 | End: 2020-06-12 | Stop reason: HOSPADM

## 2020-06-10 RX ORDER — ADHESIVE BANDAGE
30 BANDAGE TOPICAL DAILY PRN
Status: DISCONTINUED | OUTPATIENT
Start: 2020-06-10 | End: 2020-06-12 | Stop reason: HOSPADM

## 2020-06-10 RX ADMIN — MUPIROCIN: 20 OINTMENT TOPICAL at 23:13

## 2020-06-10 RX ADMIN — SODIUM CHLORIDE 5 MILLION UNITS: 900 INJECTION, SOLUTION INTRAVENOUS at 14:43

## 2020-06-10 RX ADMIN — OXYTOCIN 30000 MILLI-UNITS: 10 INJECTION INTRAVENOUS at 18:58

## 2020-06-10 RX ADMIN — SODIUM CHLORIDE, SODIUM LACTATE, POTASSIUM CHLORIDE, AND CALCIUM CHLORIDE 999 ML/HR: 600; 310; 30; 20 INJECTION, SOLUTION INTRAVENOUS at 14:45

## 2020-06-10 RX ADMIN — OXYTOCIN 500 MILLI-UNITS/MIN: 10 INJECTION INTRAVENOUS at 20:10

## 2020-06-10 RX ADMIN — IBUPROFEN 800 MG: 800 TABLET ORAL at 23:04

## 2020-06-10 RX ADMIN — OXYTOCIN 30000 MILLI-UNITS: 10 INJECTION, SOLUTION INTRAMUSCULAR; INTRAVENOUS at 18:58

## 2020-06-10 RX ADMIN — SODIUM CHLORIDE, SODIUM LACTATE, POTASSIUM CHLORIDE, AND CALCIUM CHLORIDE 125 ML/HR: 600; 310; 30; 20 INJECTION, SOLUTION INTRAVENOUS at 16:24

## 2020-06-10 RX ADMIN — SODIUM CHLORIDE, SODIUM LACTATE, POTASSIUM CHLORIDE, AND CALCIUM CHLORIDE 999 ML/HR: 600; 310; 30; 20 INJECTION, SOLUTION INTRAVENOUS at 16:15

## 2020-06-10 RX ADMIN — BUPIVACAINE HYDROCHLORIDE 10 ML: 2.5 INJECTION, SOLUTION EPIDURAL; INFILTRATION; INTRACAUDAL; PERINEURAL at 16:11

## 2020-06-10 RX ADMIN — Medication 10 ML/HR: at 16:37

## 2020-06-10 NOTE — ANESTHESIA PROCEDURE NOTES
Epidural Block    Start time: 6/10/2020 4:04 PM  End time: 6/10/2020 4:12 PM  Performed by: Julio De La O MD  Authorized by: Julio De La O MD     Pre-Procedure  Indication: labor epidural    Preanesthetic Checklist: patient identified, risks and benefits discussed, anesthesia consent, timeout performed and anesthesia consent    Timeout Time: 16:04        Epidural:   Patient position:  Seated  Prep region:  Lumbar  Prep: Betadine    Location:  L3-4    Needle and Epidural Catheter:   Needle Type:  Tuohy  Needle Gauge:  17 G  Injection Technique:  Loss of resistance using air  Attempts:  1  Catheter Size:  18 G  Catheter at Skin Depth (cm):  9  Depth in Epidural Space (cm):  4  Events: no paresthesia and negative aspiration test    Test Dose:  Lidocaine 1.5% w/ epi and negative    Assessment:   Catheter Secured:  Tegaderm and tape  Insertion:  Uncomplicated  Patient tolerance:  Patient tolerated the procedure well with no immediate complications

## 2020-06-10 NOTE — PROGRESS NOTES
1405: This RN at bedside. Patient leaning over side of bed utilizing breathing technique to cope with intermittent abdominal contraction. Patient's spouse, Evelia Flores, at bedside. Patient reports intermittent abdominal contractions began today around 0600. Patient reports + fetal movement. Patient denies LOF. Patient reports clear, mucous discharge. Patient reports desire for natural labor. Patient reports allergic reaction to seafood, but denies allergy to iodine. 1410: This RN continuously at bedside. Jose EID at bedside assessing patient. SVE performed by Jose EID: 4/90/0. Patient tolerated well. CNM noting clear, mucous discharge. 1455: NST reactive, verified by AZAEL Garcia CNM. Christophe Devries disconnecting patient from [facility] to allow patient to ambulate at this time. Gripper socks applied. 1445: Patient reports desire for epidural at this time. This RN verbalizing understanding and informing Jose ANTONIOM via Vocera with Fawad Car. CNM verbalizing understanding, VORB to begin LR bolus for epidural placement. 1555: This RN at bedside. Patient assisted into appropriate position for epidural placement. This RN standing directly in front of patient. Lula Sue MD at bedside assessing patient and educating patient about epidural placement and discussing blood patch procedure (incase it were ever necessary) due to [address] Denominational preference. 1600: Patient sitting upright for epidural, FHR difficult to trace due to patient's position. 1604: Time out performed for epidural placement by this RN with patient and Lula Sue MD.    7412: Successful epidural test dose. 1615: Patient assisted back into bed on lateral right per VORB from Lula Sue MD. This RN remains continuously at bedside. This RN adjusting FHR US. HOB elevated. Bed remains locked and lowered. Call bell within reach. FOB returning to bedside. 1625: Jose EID at bedside. CNM palpating intermittent abdominal contractions.     1705: Christophe Devries stepping away from patient's bedside at this time. 1720: This RN at bedside. Patient repositioned onto lateral left with pillow placed behind lower back for additional support. Additional bed pad placed below patient. Patient able to lift hips bilaterally. Bed remains locked and lowered, call bell within reach. FOB remains at bedside. No sign of distress noted. 1740: TORB to straight cath patient. 1745: Straight cath performed via sterile procedure by this RN, 300ml clear/yellow urine output. 1748: SROM, clear moderate fluid. Chux changed by this RN. 1753: Patient assisted into throne position with BLE in butterfly position with bed slightly lowered. Bed remains locked and lowered, call bell within reach. 180: This RN informing Jose EID of SROM. CNM verbalizing understanding, no new orders received. 182: This RN at bedside. Patient reports intermittent rectal presure. This RN verbalizing understanding and informing Jamil RN via [employer]. Suzy Lee RN to call Ely Calderon CNM via telephone. This RN remains at bedside preparing delivery table for CNM. 1830: Jose EID at bedside assessing patient and FHR tracing. SVE performed by MARISELAM: 9.5 cm. Patient to trial push with CNM. 1837: This RN remains at bedside. Patient to continue pushing with Odette Lesch. Christian [facility] Nurs[facility] RN at bedside. 1848:  of viable, infant girl. Infant immediately placed on maternal abdomen, dried and stimulated. 1849: PPP titration began per VORB from Odette Lesch. 1853: Expression of intact placenta. 1855: Jose EID repairing 2nd degree laceration. .    1905: Bedside, Verbal and Written shift change report given to Chelita Leyva RN (oncoming nurse) by Elli Polo RN (offgoing nurse). Report included the following information SBAR, Intake/Output, MAR and Recent Results.  At this time, patient resting in locked and lowered stretcher with infant skin-to-skin while Jose EID finishes repair of second degree laceration. Bed locked. FOB standing at bedside. This RN leaving the bedside. Panella RN remains at bedside. No sign of distress noted at time of care transfer.

## 2020-06-10 NOTE — H&P
History & Physical    Name: Lionel Bence MRN: [id number]  SSN: [id number]    YOB: 1985  Age: 29 y.o. Sex: female        Subjective: Taryn Hernandez is a 28 YO GeP1 at 39+1 weeks gestation admitted in LD with c/o regular painful  Contractions since 0630 this morning with mucous discharge. Endorses GFM. Hx of Pre- E with her prior pregnancy. She is not anemic. Estimated Date of Delivery: 20  OB History    Para Term  AB Living   3 1 1   1 1   SAB TAB Ectopic Molar Multiple Live Births   1       0 1      # Outcome Date GA Lbr Pierre/2nd Weight Sex Delivery Anes PTL Lv   3 Current            2 SAB 10/2018           1 Term 16 39w5d  6 lb 10.2 oz (3.01 kg) M VAGINAL DELI None N ALIE      Birth Comments: pre-eclampsia       Ms. Pierre Hamlin is admitted with pregnancy at 39w1d for Increasingly painful contractions. Prenatal course was complicated by  labor, which resolved, did receive steroids. Please see prenatal records for details. Past Medical History:   Diagnosis Date    Abnormal Papanicolaou smear of cervix     Years ago    Bacterial vaginosis     Depression     Epilepsy ([facility] [address]     last seizure 5years; secondary to low blood sugar, per pt.  Gestational hypertension     Hypoglycemia ,     Pancreatitis 2007    Preeclampsia 2016    First pregnancy     History reviewed. No pertinent surgical history.   Social History     Occupational History    Not on file   Tobacco Use    Smoking status: Former Smoker     Last attempt to quit: 3/7/2013     Years since quittin.2    Smokeless tobacco: Never Used   Substance and Sexual Activity    Alcohol use: No     Alcohol/week: 0.0 standard drinks    Drug use: No    Sexual activity: Yes     Partners: Male     Birth control/protection: None     Comment: in relationship since      Family History   Problem Relation Age of Onset    Elevated Lipids Mother     Elevated Lipids Father     Diabetes Maternal Grandmother     Elevated Lipids Maternal Grandmother     Hypertension Maternal Grandmother     Cancer Paternal Grandmother     Cancer Paternal Grandfather     Hypertension Maternal Aunt        Allergies   Allergen Reactions    Seafood Hives and Nausea and Vomiting     Prior to Admission medications    Medication Sig Start Date End Date Taking? Authorizing Provider   citalopram (CELEXA) 20 mg tablet Take 1 Tab by mouth daily. Indications: Anxiousness associated with Depression 1/14/20  Yes Sravan SMITH CNM   cholecalciferol, vitamin D3, (VITAMIN D3) 2,000 unit tab Take  by mouth. Yes Provider, Historical   aspirin delayed-release 81 mg tablet Take  by mouth daily. Yes Provider, Historical   PRENATAL VIT W-CA,FE,FA,<1 MG, (PRENATAL VITAMIN PO) Take  by mouth. Yes Provider, Historical   doxylamine succinate (UNISOM) 25 mg tablet Take 25 mg by mouth nightly as needed. Provider, Historical   doxylamine-pyridoxine, vit B6, (DICLEGIS) 10-10 mg TbEC DR tablet Take two tabs by mouth every bedtime . May add one tab in the morning and one in the afternoon, max of 4 tabs per day 11/7/19   Mirna Barbosa CNM        Review of Systems: A comprehensive review of systems was negative except for that written in the HPI. Objective:     Vitals:  Vitals:    06/10/20 1412   BP: 143/82   Pulse: 78   Resp: 16   Temp: 98.4 °F (36.9 °C)   SpO2: 98%   Weight: 190 lb (86.2 kg)   Height: 5' 6\" (1.676 m)        Physical Exam:  Patient with distress.   Breast: normal breast exam  Heart: Regular rate and rhythm or without murmur or extra heart sounds  Lung: clear to auscultation throughout lung fields, no wheezes, no rales, no rhonchi and normal respiratory effort  Back: costovertebral angle tenderness absent  Abdomen: soft, nontender  Fundus: soft and non tender  Perineum: blood absent, amniotic fluid absent  Cervical Exam: 4 cm dilated    90% effaced    -1 station    Presenting Part: cephalic  Cervical Position: posterior  Consistency: Soft  Lower Extremities:  - Edema No  Membranes:  Intact  Fetal Heart Rate: Reactive  Baseline: 150 per minute  Variability: moderate  Accelerations: yes  Uterine contractions: irregular, every 3-5 minutes    Prenatal Labs:   Lab Results   Component Value Date/Time    Rubella, External 1.72 08/04/2015    GrBStrep, External POSITIVE 05/18/2020    HBsAg, External Negative 08/04/2015    HIV, External <1.00 Non Reactive 08/04/2015    ABO,Rh A Positive 08/04/2015         Assessment/Plan:     Active Problems:    History of pre-eclampsia in prior pregnancy, currently pregnant (11/6/2019)      Indication for care or intervention in labor or delivery (6/10/2020)         Plan: Admit for Reassuring fetal status, Labor  Progressing normally, Continue plan for vaginal delivery. Group B Strep was positive, will treat prophylactically with penicillin. Epidural if desired.  May ambulate with IA if does not desire epidural.

## 2020-06-10 NOTE — ANESTHESIA PREPROCEDURE EVALUATION
Relevant Problems   No relevant active problems       Anesthetic History   No history of anesthetic complications            Review of Systems / Medical History  Patient summary reviewed and pertinent labs reviewed    Pulmonary  Within defined limits                 Neuro/Psych         Psychiatric history     Cardiovascular  Within defined limits                Exercise tolerance: >4 METS     GI/Hepatic/Renal  Within defined limits              Endo/Other  Within defined limits           Other Findings              Physical Exam    Airway  Mallampati: II  TM Distance: 4 - 6 cm  Neck ROM: normal range of motion   Mouth opening: Normal     Cardiovascular    Rhythm: regular  Rate: normal         Dental    Dentition: Upper dentition intact and Lower dentition intact     Pulmonary  Breath sounds clear to auscultation               Abdominal         Other Findings            Anesthetic Plan    ASA: 2  Anesthesia type: epidural          Induction: Intravenous  Anesthetic plan and risks discussed with: Patient

## 2020-06-10 NOTE — L&D DELIVERY NOTE
/Pt became completely dilated at 1833. She began pushing and pushed effectively to  of live female  at [facility] in МАРИЯ position after head was delivered, right anterior shoulder easily born by me with minimal traction and remainder of body into dad's hand and placed on to Farideh's abdomen Baby for drying and stabilization with apgars of 9 and 9. Delayed cord clamping employed until pulsation stopped at 1851. Cord was double clamped and cut by shayy. Placenta expressed at [facility] with 3 vessels, normal in appearance upon examination. Fundus firm with massage and pitocin via IVF.  cc Perineum inspected and small 2nd laceration noted and was repaired with 3-0 vicyrl. Mother and  stable in LDR.  weight is pending as baby is skin to skin. Excellent breast feeding established. Delivery Summary    Patient: Megan Oconnor MRN: [id number]  SSN: [id number]    YOB: 1985  Age: 29 y.o. Sex: female       Information for the patient's :  Joanne Joe [[id number]]       Labor Events:    Labor: No    Steroids: Full Course   Cervical Ripening Date/Time:       Cervical Ripening Type: None   Antibiotics During Labor: Yes   Rupture Identifier:      Rupture Date/Time: 6/10/2020 5:48 PM   Rupture Type: SROM   Amniotic Fluid Volume:  Moderate    Amniotic Fluid Description: Clear    Amniotic Fluid Odor: None    Induction: None       Induction Date/Time:        Indications for Induction:      Augmentation: None   Augmentation Date/Time:      Indications for Augmentation:     Labor complications: None       Additional complications:        Delivery Events:  Indications For Episiotomy:     Episiotomy: None   Perineal Laceration(s): 2nd   Repaired: Yes   Periurethral Laceration Location:      Repaired:     Labial Laceration Location:     Repaired:     Sulcal Laceration Location:     Repaired:     Vaginal Laceration Location:     Repaired:     Cervical Laceration Location:     Repaired:     Repair Suture: Vicryl 3-0   Number of Repair Packets: 1   Estimated Blood Loss (ml):  ml   Quantitative Blood Loss (ml)                Delivery Date: 6/10/2020    Delivery Time: 6:48 PM  Delivery Type: Vaginal, Spontaneous  Sex:  Female    Gestational Age: 36w3d   Delivery Clinician:  Bandar Garcia  Living Status: Living   Delivery Location: & 211          APGARS  One minute Five minutes Ten minutes   Skin color: 1   1        Heart rate: 2   2        Grimace: 2   2        Muscle tone: 2   2        Breathin   2        Totals: 9   9            Presentation: Vertex    Position: Left Occiput Anterior  Resuscitation Method:  Tactile Stimulation;Suctioning-bulb     Meconium Stained: None      Cord Information: 3 Vessels  Complications: None  Cord around:    Delayed cord clamping? Yes  Cord clamped date/time:6/10/2020  6:51 PM  Disposition of Cord Blood: Discard    Blood Gases Sent?: No    Placenta:  Date/Time: 6/10/2020  6:53 PM  Removal: Expressed      Appearance: Normal;Intact     [place] Measurements:  Birth Weight:        Birth Length:        Head Circumference:        Chest Circumference:       Abdominal Girth: Other Providers:   Kaylie Monterroso, Primary Nurse;Primary [place] Nurse;Midwife           Group B Strep:   Lab Results   Component Value Date/Time    GrBStrep, External POSITIVE 2020     Information for the patient's :  Gretel Braxtonlle [[id number]]   No results found for: ABORH, PCTABR, PCTDIG, BILI, ABORHEXT, ABORH    No results for input(s): PCO2CB, PO2CB, HCO3I, SO2I, IBD, PTEMPI, SPECTI, PHICB, ISITE, IDEV, IALLEN in the last 72 hours.

## 2020-06-10 NOTE — PROGRESS NOTES
1900: Bedside and Verbal shift change report given to CHRISTOS Garcia RN (oncoming nurse) by Aneta YAP (offgoing nurse). Report included the following information SBAR, Kardex, Intake/Output, MAR, Recent Results and Med Rec Status. 2150: TRANSFER - OUT REPORT:    Verbal report given to DEVON Everett RN(name) on [facility]  being transferred to MIU(unit) for routine progression of care       Report consisted of patients Situation, Background, Assessment and   Recommendations(SBAR). Information from the following report(s) SBAR, Kardex, Intake/Output, MAR, Recent Results and Med Rec Status was reviewed with the receiving nurse. Lines:   Peripheral IV 06/10/20 Posterior;Right Hand (Active)   Site Assessment Clean, dry, & intact 6/10/2020  7:35 PM   Phlebitis Assessment 0 6/10/2020  7:35 PM   Infiltration Assessment 0 6/10/2020  7:35 PM   Dressing Status Clean, dry, & intact 6/10/2020  7:35 PM   Dressing Type Tape;Transparent 6/10/2020  7:35 PM   Hub Color/Line Status Pink 6/10/2020  7:35 PM   Action Taken Blood drawn 6/10/2020  2:31 PM   Alcohol Cap Used Yes 6/10/2020  2:31 PM        Opportunity for questions and clarification was provided.       Patient transported with:   Registered Nurse

## 2020-06-11 ENCOUNTER — TELEPHONE (OUTPATIENT)
Dept: OBGYN CLINIC | Age: 35
End: 2020-06-11

## 2020-06-11 PROCEDURE — 65270000029 HC RM PRIVATE

## 2020-06-11 PROCEDURE — 74011250637 HC RX REV CODE- 250/637: Performed by: ADVANCED PRACTICE MIDWIFE

## 2020-06-11 RX ADMIN — Medication 1 TABLET: at 09:47

## 2020-06-11 RX ADMIN — IBUPROFEN 800 MG: 800 TABLET ORAL at 17:37

## 2020-06-11 RX ADMIN — IBUPROFEN 800 MG: 800 TABLET ORAL at 09:47

## 2020-06-11 NOTE — ROUTINE PROCESS
Bedside and Verbal shift change report given to [address] L[address]aro Johnson (oncoming nurse) by Vikki Mahmood RN (offgoing nurse). Report included the following information SBAR, Intake/Output, MAR and Recent Results.

## 2020-06-11 NOTE — PROGRESS NOTES
Call to [facility] to follow up on T Pallidium/RPR results, spoke with nurse, left message to call back. --  1530- spoke with RAGINI Garcia, TPallidium/RPR results not located, received order to draw lab. Called lab, spoke with Dariusz Wynne, sample available on hold can be used. Awaiting results.

## 2020-06-11 NOTE — PROGRESS NOTES
Spiritual Care Assessment/Progress Note  [address]      NAME: Cinda Pena      MRN: [id number]  AGE: 29 y.o. SEX: female  Mosque Affiliation: Jehovah witness   Language: English     6/11/2020     Total Time (in minutes): 8     Spiritual Assessment begun in [facility] through conversation with:         [x]Patient        [] Family    [] Friend(s)        Reason for Consult: Request by staff     Spiritual beliefs: (Please include comment if needed)     [] Identifies with a meagan tradition:         [] Supported by a meagan community:            [] Claims no spiritual orientation:           [] Seeking spiritual identity:                [] Adheres to an individual form of spirituality:           [x] Not able to assess:                           Identified resources for coping:      [] Prayer                               [] Music                  [] Guided Imagery     [] Family/friends                 [] Pet visits     [] Devotional reading                         [x] Unknown     [] Other:                                              Interventions offered during this visit: (See comments for more details)                Plan of Care:     [] Support spiritual and/or cultural needs    [] Support AMD and/or advance care planning process      [] Support grieving process   [] Coordinate Rites and/or Rituals    [] Coordination with community clergy   [] No spiritual needs identified at this time   [] Detailed Plan of Care below (See Comments)  [] Make referral to Music Therapy  [] Make referral to Pet Therapy     [] Make referral to Addiction services  [] Make referral to [facility]  [] Make referral to Spiritual Care Partner  [] No future visits requested        [x] Follow up visits as needed     Comments:  responded to in basket request for spiritual/ethnic/Oriental orthodox need for pt Miss Fran Galindo, at the Mother Infant unit. Consulted with pt's nurse, pt was sleeping at the time of visit.  Nurse was advised to contact  as needed. Spiritual care will continue to follow up as needed or as able  Visited by: Teresa Garcia.   To eugenie : [phone]

## 2020-06-11 NOTE — PROGRESS NOTES
CNMPostpartumNoteDay1  S: Patient ambulating and voiding without difficulty. Patient happy with birth experience. Pleased to have birth with CNM. Breastfeeding well. No complaints. O: Vital signs stable, Heart RRR without murmur, Lungs CTA bilaterally, FF below umbilicus, lochia rubra light or moderate, breasts soft, nipples intact, no edema, negative S/S DVT.       A: Postpartum Day 1  Breastfeeding  Blood type positive/Rubella immune/GBS positive   P: Continue current postpartum orders  Lactation consult   Anticipate discharge tomorrow  Offer Tdap if has not had this pregnancy

## 2020-06-11 NOTE — ANESTHESIA POSTPROCEDURE EVALUATION
* No procedures listed *.    epidural    Anesthesia Post Evaluation      Multimodal analgesia: multimodal analgesia used between 6 hours prior to anesthesia start to PACU discharge  Patient location during evaluation: bedside  Patient participation: complete - patient participated  Level of consciousness: awake  Pain management: adequate  Airway patency: patent  Anesthetic complications: no  Cardiovascular status: acceptable  Respiratory status: acceptable  Hydration status: acceptable        INITIAL Post-op Vital signs: No vitals data found for the desired time range.

## 2020-06-11 NOTE — LACTATION NOTE
This note was copied from a baby's chart. Discussed with mother her plan for feeding. Reviewed the benefits of exclusive breast milk feeding during the hospital stay. Informed her of the risks of using formula to supplement in the first few days of life as well as the benefits of successful breast milk feeding; referred her to the Breastfeeding booklet about this information. She acknowledges understanding of information reviewed and states that it is her plan to breastfeed her infant. Will support her choice and offer additional information as needed. Reviewed breastfeeding basics:  How milk is made and normal  breastfeeding behaviors discussed. Supply and demand,  stomach size, early feeding cues, skin to skin bonding with comfortable positioning and baby led latch-on reviewed. How to identify signs of successful breastfeeding sessions reviewed; education on assymetrical latch, signs of effective latching vs shallow, in-effective latching, normal  feeding frequency and duration and expected infant output discussed. Normal course of breastfeeding discussed including the AAP's recommendation that children receive exclusive breast milk feedings for the first six months of life with breast milk feedings to continue through the first year of life and/or beyond as complimentary table foods are added. Breastfeeding Booklet and Warm line information provided with discussion. Discussed typical  weight loss and the importance of pediatrician appointment within 24-48 hours of discharge, at 2 weeks of life and normalcy of requesting pediatric weight checks as needed in between visits. Mom states\" I hope to try to avoid a pacifier but I am not sure if baby will. \"    Instructed Mom to call Lc when she gets ready to feed

## 2020-06-11 NOTE — TELEPHONE ENCOUNTER
Micki Lauren from [facility] ( baby's doctor)  at [address] [place] [address] calling to talk to nurse stating she is not seeing the current T-Pallidum and RPR lab results. She states if these labs can not be found they will need to redo them. Please advise. Thank you!

## 2020-06-12 VITALS
TEMPERATURE: 98.4 F | WEIGHT: 190 LBS | RESPIRATION RATE: 16 BRPM | HEIGHT: 66 IN | DIASTOLIC BLOOD PRESSURE: 55 MMHG | HEART RATE: 73 BPM | BODY MASS INDEX: 30.53 KG/M2 | OXYGEN SATURATION: 98 % | SYSTOLIC BLOOD PRESSURE: 115 MMHG

## 2020-06-12 LAB — RPR SER QL: NONREACTIVE

## 2020-06-12 PROCEDURE — 74011250637 HC RX REV CODE- 250/637: Performed by: ADVANCED PRACTICE MIDWIFE

## 2020-06-12 RX ORDER — CITALOPRAM 20 MG/1
20 TABLET, FILM COATED ORAL DAILY
Qty: 90 TAB | Refills: 3 | Status: SHIPPED | OUTPATIENT
Start: 2020-06-12

## 2020-06-12 RX ORDER — CITALOPRAM 20 MG/1
20 TABLET, FILM COATED ORAL DAILY
Qty: 30 TAB | Refills: 5 | Status: SHIPPED | OUTPATIENT
Start: 2020-06-12

## 2020-06-12 RX ADMIN — IBUPROFEN 800 MG: 800 TABLET ORAL at 01:48

## 2020-06-12 NOTE — DISCHARGE INSTRUCTIONS
Share with Women  After Your Baby Is Born: What  to Expect Postpartum  What can I expect in the first few months after my baby is born? Your body and emotions change a lot in the first weeks and months after you give birth. Abdomen: Your abdomen (belly) may still look pregnant for a few weeks. In the first few days, you may have  cramping as your uterus (womb) goes back to its normal size. Vagina: You will have vaginal bleeding for about 4 to 5 days after you give birth that is like a heavy period. You might pass a few clots. The bleeding usually goes away after 2 to 3 weeks, but you may have some vaginal  bleeding or spotting for up to 8 weeks after giving birth. Your vagina may be tender and dry for a few months. Perineum: This is the area between your vagina and your anus (where stool comes out). You may have  soreness in this area for a few weeks, especially if you have a tear or stitches. You can put an ice pack on this  area the first day after birth. After 24 hours, sitting in a warm bath can help with the soreness. If you have  hemorrhoids, you can use witch hazel pads from the drugstore to help the hemorrhoid pain. Breasts: Your milk will come in about 2 to 5 days after you give birth. Your breasts will feel full and tender  as they begin to fill with milk. This is called engorgement. Wearing a tight bra can help ease the aching. Your  nipples may also be sore as they become used to having your baby suckle them. If your baby is latching properly,  the pain will go away after the first few minutes of breastfeeding. Do not pump or express milk to make the  engorgement go away. This will just make more milk come in, and your breasts will stay engorged. It can take 6  to 8 weeks for both you and your baby to become really used to breastfeeding. Bowel and Bladder: You may have some gas pain during the first few weeks. You may be constipated, especially  if you are breastfeeding.  You can prevent constipation by drinking plenty of water and eating lots of  fruits and vegetables. In the first few months postpartum, some women leak urine when coughing, sneezing,  or picking up something heavy. You can start doing pelvic muscle exercises (often called Kegel exercises) right  away to strengthen the muscles that control and support your bladder.  Incision: If you had a  birth, it will take a few extra weeks before you are completely healed  from the surgery. Take pain medication as you need it and rest when you can. The outside of your incision (cut)  should heal after 2 to 3 weeks. You may have soreness or numbness at the incision for several months. Sex: Your body needs time to heal after giving birth. While your hormones are adjusting, you may have less  desire for sex, vaginal dryness, and/or tenderness in your vagina or perineum. It is important tomake time to be  with your partner and share physical touching in ways that you both like, whether or not you are ready to start  having sex. Inmost cases, you can start having vaginal sex when you feel ready and your bleeding has stopped. If you are breastfeeding, you might need to use lubricant. You can get pregnant before you start having periods  again so it is important to use birth control if you do not want to become pregnant right away. Talk with your  health care provider about which method is best for you. Weight: It can take up to 6 months to lose the weight you gained during pregnancy. Because a healthy diet  is so important for breastfeeding, do not diet. Gentle exercise, such as taking walks, can help you start to lose  weight until you can start doing more heavy exercise. Emotions and PostpartumDepression: Women have a wide range of emotions after giving birth. You may  feel excited, happy, exhausted, and depressed all on the same day as you adjust to a newworld, a newbaby, and  a newjob taking care of your baby.  Having lots of different feelings is normal.   About 7 in every 10 women will have postpartum blues.  This usually starts about 3 days after the birth  of your baby and can last 1 or 2 weeks. You may cry easily and feel sad, irritable or tired. Postpartum blues  usually go away once you start to get 4 to 5 hours of sleep each night that is not interrupted. 4696-0472/09/$36.00 doi:10.1111/jmwh. [address] c 2017 by [address]. sharewithwFauquier [facility] System. org   About 10 to 15 out of every 100 women will have postpartum depression. Postpartum depression usually  starts about 2 months after your baby is born and can last for 6 to 12 months. You may feel very sad, anxious,  or overwhelmed or have mood swings and guilt. You are at higher risk for depression if you have a history of  depression yourself or in your family, had depression during your pregnancy, have a sick baby, and/or have  many stressful things going on in your life. About 1 in 56 women will develop a rare but serious health problem called postpartum psychosis. This  can start anytime in the first weeks after giving birth. Women with postpartum psychosis have severe problems  thinking normally. You may have strange beliefs, hallucinations (see or hear things that arent there) or  paranoia (feel suspicious). If you have a history of bipolar disorder yourself or in your family or have had  psychosis before, you are at higher risk for postpartum psychosis. Call your health care provider right away if you feel very nervous, cannot stop crying, or are having thoughts of  hurting yourself or your baby. What can I do to help me recover and adjust to being a mother? Ask for help. Let other people do the cooking and cleaning. Focus on yourself and your baby. Sleep when your baby sleeps. Your body needs rest to heal.   Get exercise and fresh air.  You can take your baby, go by yourself, or walk with your partner or a friend. Take a few minutes every day for yourself, even just to shower and rest for a bit, read, or listen to music. Talk to other mothers. You can join a parents support group or just spend time with other mothers. Make time every day to enjoy your baby. Encourage your partner to do this, too! When do I need to call my health care provider? You have a fever of 100.4°F or above. You soak a pad in an hour or less or have golf-ball sized blood clots or larger. Your  incision or stitches in your vagina become red, swollen, or have pus. Your discharge has a foul odor, especially if you also have pain or tenderness in your abdomen. You have a severe headache that does not go away with medication or have changes in your vision. You have severe pain, redness, or swelling in the back your legs. You have severe depression, hallucinations, or thoughts of hurting yourself, your baby, or someone else. For More Information  American College ofNurse-Midwives  [employer].  KidsHealth  http://kidshealth.org/en/parents/recovering-delivery. html#  Flesch-Rosalind Grade Level: 7.1  Approved 2016. This handout replaces Motherhood: The Early Days published in Volume 54,  Number 6, 2009. This page may be reproduced for noncommercial use by health care professionals to share with clients. Any  other reproduction is subject to the Journal ofMidwifery&Womens Healths approval.The information and  recommendations appearing on this page are appropriate in most instances, but they are not a substitute  for medical diagnosis. For specific information concerning your personal medical condition, the Journal of   [facility] suggests that you consult your health care provider. 134 Volume 62, No. 1, 2017      Thank you for choosing [facility].   We know you have many options when it comes to your healthcare; we appreciate that you picked us. Our goal is to provide exceptional service and world class care for every patient. You may receive a survey in the mail or by email asking for your feedback. Please take a few minutes to share your thoughts about your recent visit. Your comments helps us understand what we do well and what we can do better. To ensure confidentiality, this survey is administered by an independent third-party, [employer] [place] participation will help us to continue and improve the quality of care that we provide to you, your family, friends, and neighbors. Thank you!

## 2020-06-12 NOTE — DISCHARGE SUMMARY
Obstetrical Discharge Summary     Name: Drew Dickinson MRN: [id number]  SSN: [id number]    YOB: 1985  Age: 29 y.o. Sex: female      Allergies: Seafood    Admit Date: 6/10/2020    Discharge Date: 2020     Admitting Physician: Judy Strickland MD     Attending Physician:  Keri Cody MD     * Admission Diagnoses: Indication for care or intervention in labor or delivery [O75.9]    * Discharge Diagnoses:   Information for the patient's :  Nelia Sprain [[id number]]   Delivery of a 6 lb 12.8 oz (3.085 kg) female infant via Vaginal, Spontaneous on 6/10/2020 at 6:48 PM  by Nonnie Led. Apgars were 9  and 9 .        Additional Diagnoses:   Hospital Problems as of 2020 Date Reviewed: 2020          Codes Class Noted - Resolved POA    Indication for care or intervention in labor or delivery ICD-10-CM: O75.9  ICD-9-CM: 659.90  6/10/2020 - Present Unknown        History of pre-eclampsia in prior pregnancy, currently pregnant ICD-10-CM: O09.299  ICD-9-CM: V23.49  2019 - Present Yes             Lab Results   Component Value Date/Time    Rubella, External Immune 2019    GrBStrep, External POSITIVE 2020    ABO,Rh A Positive 2015      Immunization History   Administered Date(s) Administered    Tdap 2020       * Procedures:   * No surgery found *      [place]  Depression Scale  I have been able to laugh and see the funny side of things: As much as I always could  I have looked forward with enjoyment to things: Rather less than I used to  I have blamed myself unnecessarily when things went wrong: Yes, some of the time  I have been anxious or worried for no good reason: Hardly ever  I have felt scared or panicky for no very good reason: No, not much  Things have been getting on top of me: Yes, sometimes I haven't been coping as well as usual  I have been so unhappy that I have had difficulty sleeping: Not very often  I have felt sad or miserable: Not very often  I have been so unhappy that I have been crying: Only occasionally  The thought of harming myself has occurred to me: Never  Total Score: 10    * Discharge Condition: good    [facility] Course: Normal hospital course following the delivery. * Disposition: Home    Discharge Medications:   Current Discharge Medication List          * Follow-up Care/Patient Instructions:   Activity: No sex for 6 weeks and No heavy lifting for 6 weeks  Diet: Regular Diet  Wound Care: Keep wound clean and dry    Follow-up Information     Follow up With Specialties Details Why [address]   6 weeks  [address] OB/GYN office

## 2020-06-12 NOTE — LACTATION NOTE
This note was copied from a baby's chart. Mom forgot to call [facility], ready to go home. Did not see at breast,    Chart shows numerous feedings, void, stool WNL. Discussed importance of monitoring outputs and feedings on first week of life. Discussed ways to tell if baby is  getting enough breast milk, ie  voids and stools, change in color of stool, and return to birth wt within 2 weeks. Follow up with pediatrician visit for weight check in 1-2 days (per AAP guidelines.)  Encouraged to call Warm Line  [phone]  for any questions/problems that arise. Mother also given breastfeeding support group dates and times for any future needsAnticipatory guidance given. Questions answered. Discussed signs of baby's allergy, excema. Discussed engorgement management, when breast are soft and flat you are making more milk than when hard and engorged. If you should have to take a medication and MD says can't breast feed contact lactation office. Breast feed if you or the baby gets sick to pass along natural immunologic protection.

## 2020-06-12 NOTE — PROGRESS NOTES
Bedside and Verbal shift change report given to Danish Ko RN (oncoming nurse) by Jeferson Rodriguez RN (offgoing nurse). Report included the following information SBAR, Kardex, Intake/Output and MAR.

## 2020-06-12 NOTE — PROGRESS NOTES
CNMPostpartumNoteDay2- Discharge     S: Patient ambulating and voiding without difficulty. Breastfeeding well. No complaints. Ready to go home. O: Vital signs stable. FF below umbilicus, lochia rubra light, breasts soft, nipples intact, no edema, negative s/s DVT     A: Postpartum Day 2- , intact perineum  Breastfeeding  Stable     P: Follow routine postpartum discharge instructions  Discharge home with baby  Ibuprofen OTC up to 800 mg every 6 hours as needed for pain or cramping  Continue PNV while breastfeeding  Continue stool softner until comfortable with bowel movements  Celexa 20 mg PO every day for discharge  S/S PPD stressed and resources reviewed.    Follow-up with CNM in 2 to 6 weeks as directed

## 2020-06-12 NOTE — LACTATION NOTE
This note was copied from a baby's chart. Mom states, \" breastfeeding is going well. \"  Instructed Mom to call [facility] when she gets ready to feed.

## 2020-07-24 ENCOUNTER — OFFICE VISIT (OUTPATIENT)
Dept: OBGYN CLINIC | Age: 35
End: 2020-07-24

## 2020-07-24 VITALS
SYSTOLIC BLOOD PRESSURE: 104 MMHG | BODY MASS INDEX: 26.52 KG/M2 | HEIGHT: 66 IN | DIASTOLIC BLOOD PRESSURE: 70 MMHG | WEIGHT: 165 LBS

## 2020-07-24 PROBLEM — Z34.80 ENCOUNTER FOR SUPERVISION OF OTHER NORMAL PREGNANCY, UNSPECIFIED TRIMESTER: Status: RESOLVED | Noted: 2019-11-06 | Resolved: 2020-07-24

## 2020-07-24 RX ORDER — ACETAMINOPHEN AND CODEINE PHOSPHATE 120; 12 MG/5ML; MG/5ML
1 SOLUTION ORAL DAILY
Qty: 3 DOSE PACK | Refills: 1 | Status: SHIPPED | OUTPATIENT
Start: 2020-07-24 | End: 2021-01-14 | Stop reason: SDUPTHER

## 2020-07-24 NOTE — PROGRESS NOTES
Postpartum evaluation    Slade Ye is a 29 y.o. female who presents for a postpartum exam.     She is now six weeks post normal spontaneous vaginal delivery. Her baby is doing well. She has had no menses since delivery. She has had the following significant problems since her delivery: none    The patient is breast feeding without difficulty. The patient would like to use progestin-only pill for birth control. She is currently taking: no medications. She is due for her next AE in 12 months.      Visit Vitals  /70   Ht 5' 6\" (1.676 m)   Wt 165 lb (74.8 kg)   Breastfeeding Yes   BMI 26.63 kg/m²       PHYSICAL EXAMINATION    Constitutional  · Appearance: well-nourished, well developed, alert, in no acute distress    HENT  · Head and Face: appears normal    Neck  · Inspection/Palpation: normal appearance, no masses or tenderness  · Lymph Nodes: no lymphadenopathy present  · Thyroid: gland size normal, nontender, no nodules or masses present on palpation      Gastrointestinal  · Abdominal Examination: abdomen non-tender to palpation, normal bowel sounds, no masses present  · Liver and spleen: no hepatomegaly present, spleen not palpable  · Hernias: no hernias identified    Genitourinary  · External Genitalia: normal appearance for age, no discharge present, no tenderness present, no inflammatory lesions present, no masses present, no atrophy present  · Vagina: normal vaginal vault without central or paravaginal defects, no discharge present, no inflammatory lesions present, no masses present  · Bladder: non-tender to palpation  · Urethra: appears normal  · Cervix: normal   · Uterus: normal size, shape and consistency  · Adnexa: no adnexal tenderness present, no adnexal masses present  · Perineum: perineum within normal limits, no evidence of trauma, no rashes or skin lesions present  · Anus: anus within normal limits, no hemorrhoids present  · Inguinal Lymph Nodes: no lymphadenopathy present    Skin  · General Inspection: no rash, no lesions identified    Neurologic/Psychiatric  · Mental Status:  · Orientation: grossly oriented to person, place and time  · Mood and Affect: mood normal, affect appropriate    Assessment:  Normal postpartum check    Plan:  RTO for AE.   Rx for contraception: Micronor

## 2020-07-24 NOTE — LETTER
7/24/2020 2:08 PM 
 
Ms. Blanca Lisa 
[id number] [place] [id number] Ms Nidia Saeed may return to unrestricted work as of 7/25/20.  
 
 
 
Sincerely, 
 
 
Sondra Amezquita MD

## 2021-01-14 RX ORDER — ACETAMINOPHEN AND CODEINE PHOSPHATE 120; 12 MG/5ML; MG/5ML
SOLUTION ORAL
Qty: 84 TAB | OUTPATIENT
Start: 2021-01-14

## 2021-01-14 NOTE — TELEPHONE ENCOUNTER
Pharmacy request for refill of Micronor  On 7/24/20 she was noted to return for annual and never did. Will decline until AE set.

## 2021-07-21 RX ORDER — ACETAMINOPHEN AND CODEINE PHOSPHATE 120; 12 MG/5ML; MG/5ML
SOLUTION ORAL
Qty: 1 DOSE PACK | Refills: 0 | Status: SHIPPED | OUTPATIENT
Start: 2021-07-21

## 2021-07-21 NOTE — TELEPHONE ENCOUNTER
28year old patient last seen in the office on 7/24/2020 for pp visit    This nurse attempted to reach the patient and left a detailed message for the patient regarding need for annual exam and refill of her ocp sent for a 30 day supply .     Patient was advised to contact the office to set up the appointment

## 2022-03-19 PROBLEM — O21.9 NAUSEA AND VOMITING OF PREGNANCY, ANTEPARTUM: Status: ACTIVE | Noted: 2019-11-06

## 2022-03-19 PROBLEM — O09.299 HISTORY OF PRE-ECLAMPSIA IN PRIOR PREGNANCY, CURRENTLY PREGNANT: Status: ACTIVE | Noted: 2019-11-06

## 2022-03-19 PROBLEM — O47.03 PRETERM UTERINE CONTRACTIONS IN THIRD TRIMESTER, ANTEPARTUM: Status: ACTIVE | Noted: 2020-04-30

## 2022-03-19 PROBLEM — Z34.90 PREGNANCY: Status: ACTIVE | Noted: 2020-04-30

## 2022-03-20 PROBLEM — F32.A DEPRESSION: Status: ACTIVE | Noted: 2019-11-06

## 2023-05-25 RX ORDER — CITALOPRAM 20 MG/1
20 TABLET ORAL DAILY
COMMUNITY
Start: 2020-06-12

## 2023-05-25 RX ORDER — ASPIRIN 81 MG/1
TABLET ORAL DAILY
COMMUNITY

## 2023-05-25 RX ORDER — DOXYLAMINE SUCCINATE AND PYRIDOXINE HYDROCHLORIDE, DELAYED RELEASE TABLETS 10 MG/10 MG 10; 10 MG/1; MG/1
TABLET, DELAYED RELEASE ORAL
COMMUNITY
Start: 2019-11-07

## 2023-05-25 RX ORDER — ACETAMINOPHEN AND CODEINE PHOSPHATE 120; 12 MG/5ML; MG/5ML
1 SOLUTION ORAL DAILY
COMMUNITY
Start: 2021-07-21